# Patient Record
Sex: MALE | Race: BLACK OR AFRICAN AMERICAN | NOT HISPANIC OR LATINO | Employment: UNEMPLOYED | URBAN - METROPOLITAN AREA
[De-identification: names, ages, dates, MRNs, and addresses within clinical notes are randomized per-mention and may not be internally consistent; named-entity substitution may affect disease eponyms.]

---

## 2019-09-10 ENCOUNTER — HOSPITAL ENCOUNTER (EMERGENCY)
Facility: HOSPITAL | Age: 39
Discharge: HOME | End: 2019-09-10
Attending: EMERGENCY MEDICINE
Payer: MEDICAID

## 2019-09-10 ENCOUNTER — HOSPITAL ENCOUNTER (EMERGENCY)
Facility: HOSPITAL | Age: 39
Discharge: HOME | End: 2019-09-10
Attending: EMERGENCY MEDICINE
Payer: COMMERCIAL

## 2019-09-10 VITALS
HEART RATE: 51 BPM | DIASTOLIC BLOOD PRESSURE: 55 MMHG | WEIGHT: 150 LBS | BODY MASS INDEX: 20.32 KG/M2 | RESPIRATION RATE: 18 BRPM | TEMPERATURE: 99 F | OXYGEN SATURATION: 100 % | SYSTOLIC BLOOD PRESSURE: 96 MMHG | HEIGHT: 72 IN

## 2019-09-10 VITALS
DIASTOLIC BLOOD PRESSURE: 67 MMHG | SYSTOLIC BLOOD PRESSURE: 110 MMHG | OXYGEN SATURATION: 97 % | RESPIRATION RATE: 16 BRPM | HEART RATE: 46 BPM | TEMPERATURE: 98 F
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
## 2019-09-10 DIAGNOSIS — S00.532A CONTUSION OF LIP AND ORAL CAVITY: ICD-10-CM

## 2019-09-10 DIAGNOSIS — R55 SYNCOPE: ICD-10-CM

## 2019-09-10 DIAGNOSIS — S00.531A CONTUSION OF LIP AND ORAL CAVITY: ICD-10-CM

## 2019-09-10 DIAGNOSIS — M54.2 NECK PAIN, ACUTE: Primary | ICD-10-CM

## 2019-09-10 DIAGNOSIS — T50.901A ACCIDENTAL DRUG OVERDOSE, INITIAL ENCOUNTER: Primary | ICD-10-CM

## 2019-09-10 LAB
ABO + RH BLD: NORMAL
ALBUMIN SERPL BCP-MCNC: 3.5 G/DL (ref 3.5–5.2)
ALBUMIN SERPL BCP-MCNC: 3.5 G/DL (ref 3.5–5.2)
ALP SERPL-CCNC: 67 U/L (ref 55–135)
ALP SERPL-CCNC: 68 U/L (ref 55–135)
ALT SERPL W/O P-5'-P-CCNC: 36 U/L (ref 10–44)
ALT SERPL W/O P-5'-P-CCNC: 43 U/L (ref 10–44)
AMPHET+METHAMPHET UR QL: NEGATIVE
ANION GAP SERPL CALC-SCNC: 8 MMOL/L (ref 8–16)
ANION GAP SERPL CALC-SCNC: 9 MMOL/L (ref 8–16)
APAP SERPL-MCNC: 42 UG/ML (ref 10–20)
AST SERPL-CCNC: 45 U/L (ref 10–40)
AST SERPL-CCNC: 66 U/L (ref 10–40)
BARBITURATES UR QL SCN>200 NG/ML: NEGATIVE
BASOPHILS # BLD AUTO: 0.05 K/UL (ref 0–0.2)
BASOPHILS # BLD AUTO: 0.06 K/UL (ref 0–0.2)
BASOPHILS NFR BLD: 0.5 % (ref 0–1.9)
BASOPHILS NFR BLD: 0.6 % (ref 0–1.9)
BENZODIAZ UR QL SCN>200 NG/ML: NEGATIVE
BILIRUB SERPL-MCNC: 0.5 MG/DL (ref 0.1–1)
BILIRUB SERPL-MCNC: 0.7 MG/DL (ref 0.1–1)
BLD GP AB SCN CELLS X3 SERPL QL: NORMAL
BUN SERPL-MCNC: 8 MG/DL (ref 6–20)
BUN SERPL-MCNC: 8 MG/DL (ref 6–20)
BZE UR QL SCN: NEGATIVE
CALCIUM SERPL-MCNC: 8.8 MG/DL (ref 8.7–10.5)
CALCIUM SERPL-MCNC: 9 MG/DL (ref 8.7–10.5)
CANNABINOIDS UR QL SCN: ABNORMAL
CHLORIDE SERPL-SCNC: 108 MMOL/L (ref 95–110)
CHLORIDE SERPL-SCNC: 109 MMOL/L (ref 95–110)
CO2 SERPL-SCNC: 25 MMOL/L (ref 23–29)
CO2 SERPL-SCNC: 26 MMOL/L (ref 23–29)
CREAT SERPL-MCNC: 0.9 MG/DL (ref 0.5–1.4)
CREAT SERPL-MCNC: 0.9 MG/DL (ref 0.5–1.4)
CREAT UR-MCNC: >450 MG/DL (ref 23–375)
DIFFERENTIAL METHOD: ABNORMAL
DIFFERENTIAL METHOD: ABNORMAL
EOSINOPHIL # BLD AUTO: 0.1 K/UL (ref 0–0.5)
EOSINOPHIL # BLD AUTO: 0.1 K/UL (ref 0–0.5)
EOSINOPHIL NFR BLD: 0.8 % (ref 0–8)
EOSINOPHIL NFR BLD: 1.2 % (ref 0–8)
ERYTHROCYTE [DISTWIDTH] IN BLOOD BY AUTOMATED COUNT: 15.7 % (ref 11.5–14.5)
ERYTHROCYTE [DISTWIDTH] IN BLOOD BY AUTOMATED COUNT: 15.8 % (ref 11.5–14.5)
EST. GFR  (AFRICAN AMERICAN): >60 ML/MIN/1.73 M^2
EST. GFR  (AFRICAN AMERICAN): >60 ML/MIN/1.73 M^2
EST. GFR  (NON AFRICAN AMERICAN): >60 ML/MIN/1.73 M^2
EST. GFR  (NON AFRICAN AMERICAN): >60 ML/MIN/1.73 M^2
ETHANOL UR-MCNC: <10 MG/DL
GLUCOSE SERPL-MCNC: 105 MG/DL (ref 70–110)
GLUCOSE SERPL-MCNC: 94 MG/DL (ref 70–110)
HCT VFR BLD AUTO: 39.1 % (ref 40–54)
HCT VFR BLD AUTO: 40.6 % (ref 40–54)
HGB BLD-MCNC: 12.3 G/DL (ref 14–18)
HGB BLD-MCNC: 12.8 G/DL (ref 14–18)
IMM GRANULOCYTES # BLD AUTO: 0.04 K/UL (ref 0–0.04)
IMM GRANULOCYTES # BLD AUTO: 0.04 K/UL (ref 0–0.04)
IMM GRANULOCYTES NFR BLD AUTO: 0.4 % (ref 0–0.5)
IMM GRANULOCYTES NFR BLD AUTO: 0.5 % (ref 0–0.5)
INR PPP: 1 (ref 0.8–1.2)
LYMPHOCYTES # BLD AUTO: 1.5 K/UL (ref 1–4.8)
LYMPHOCYTES # BLD AUTO: 3 K/UL (ref 1–4.8)
LYMPHOCYTES NFR BLD: 19 % (ref 18–48)
LYMPHOCYTES NFR BLD: 27.4 % (ref 18–48)
MCH RBC QN AUTO: 24 PG (ref 27–31)
MCH RBC QN AUTO: 24.4 PG (ref 27–31)
MCHC RBC AUTO-ENTMCNC: 31.5 G/DL (ref 32–36)
MCHC RBC AUTO-ENTMCNC: 31.5 G/DL (ref 32–36)
MCV RBC AUTO: 76 FL (ref 82–98)
MCV RBC AUTO: 77 FL (ref 82–98)
METHADONE UR QL SCN>300 NG/ML: NEGATIVE
MONOCYTES # BLD AUTO: 0.6 K/UL (ref 0.3–1)
MONOCYTES # BLD AUTO: 0.9 K/UL (ref 0.3–1)
MONOCYTES NFR BLD: 7.8 % (ref 4–15)
MONOCYTES NFR BLD: 7.8 % (ref 4–15)
NEUTROPHILS # BLD AUTO: 5.7 K/UL (ref 1.8–7.7)
NEUTROPHILS # BLD AUTO: 7 K/UL (ref 1.8–7.7)
NEUTROPHILS NFR BLD: 63.1 % (ref 38–73)
NEUTROPHILS NFR BLD: 70.9 % (ref 38–73)
NRBC BLD-RTO: 0 /100 WBC
NRBC BLD-RTO: 0 /100 WBC
OPIATES UR QL SCN: NEGATIVE
PCP UR QL SCN>25 NG/ML: NEGATIVE
PLATELET # BLD AUTO: 224 K/UL (ref 150–350)
PLATELET # BLD AUTO: 251 K/UL (ref 150–350)
PMV BLD AUTO: 10.6 FL (ref 9.2–12.9)
PMV BLD AUTO: 10.6 FL (ref 9.2–12.9)
POTASSIUM SERPL-SCNC: 3.9 MMOL/L (ref 3.5–5.1)
POTASSIUM SERPL-SCNC: 3.9 MMOL/L (ref 3.5–5.1)
PROT SERPL-MCNC: 6.4 G/DL (ref 6–8.4)
PROT SERPL-MCNC: 6.5 G/DL (ref 6–8.4)
PROTHROMBIN TIME: 10.7 SEC (ref 9–12.5)
RBC # BLD AUTO: 5.12 M/UL (ref 4.6–6.2)
RBC # BLD AUTO: 5.25 M/UL (ref 4.6–6.2)
SODIUM SERPL-SCNC: 142 MMOL/L (ref 136–145)
SODIUM SERPL-SCNC: 143 MMOL/L (ref 136–145)
TOXICOLOGY INFORMATION: ABNORMAL
WBC # BLD AUTO: 11.08 K/UL (ref 3.9–12.7)
WBC # BLD AUTO: 8.1 K/UL (ref 3.9–12.7)

## 2019-09-10 PROCEDURE — 99284 PR EMERGENCY DEPT VISIT,LEVEL IV: ICD-10-PCS | Mod: 25,,, | Performed by: EMERGENCY MEDICINE

## 2019-09-10 PROCEDURE — 80307 DRUG TEST PRSMV CHEM ANLYZR: CPT

## 2019-09-10 PROCEDURE — 96360 HYDRATION IV INFUSION INIT: CPT

## 2019-09-10 PROCEDURE — 80053 COMPREHEN METABOLIC PANEL: CPT

## 2019-09-10 PROCEDURE — 85025 COMPLETE CBC W/AUTO DIFF WBC: CPT

## 2019-09-10 PROCEDURE — 80329 ANALGESICS NON-OPIOID 1 OR 2: CPT

## 2019-09-10 PROCEDURE — 99285 EMERGENCY DEPT VISIT HI MDM: CPT | Mod: 25,27

## 2019-09-10 PROCEDURE — 99285 PR EMERGENCY DEPT VISIT,LEVEL V: ICD-10-PCS | Mod: ,,, | Performed by: EMERGENCY MEDICINE

## 2019-09-10 PROCEDURE — 25000003 PHARM REV CODE 250: Performed by: EMERGENCY MEDICINE

## 2019-09-10 PROCEDURE — 96361 HYDRATE IV INFUSION ADD-ON: CPT

## 2019-09-10 PROCEDURE — 93005 ELECTROCARDIOGRAM TRACING: CPT

## 2019-09-10 PROCEDURE — 63600175 PHARM REV CODE 636 W HCPCS: Performed by: EMERGENCY MEDICINE

## 2019-09-10 PROCEDURE — 85025 COMPLETE CBC W/AUTO DIFF WBC: CPT | Mod: 91

## 2019-09-10 PROCEDURE — 99284 EMERGENCY DEPT VISIT MOD MDM: CPT | Mod: 25

## 2019-09-10 PROCEDURE — 25500020 PHARM REV CODE 255: Performed by: EMERGENCY MEDICINE

## 2019-09-10 PROCEDURE — 85610 PROTHROMBIN TIME: CPT

## 2019-09-10 PROCEDURE — 80053 COMPREHEN METABOLIC PANEL: CPT | Mod: 91

## 2019-09-10 PROCEDURE — 93010 ELECTROCARDIOGRAM REPORT: CPT | Mod: ,,, | Performed by: INTERNAL MEDICINE

## 2019-09-10 PROCEDURE — 93010 EKG 12-LEAD: ICD-10-PCS | Mod: ,,, | Performed by: INTERNAL MEDICINE

## 2019-09-10 PROCEDURE — 99284 EMERGENCY DEPT VISIT MOD MDM: CPT | Mod: 25,,, | Performed by: EMERGENCY MEDICINE

## 2019-09-10 PROCEDURE — 96374 THER/PROPH/DIAG INJ IV PUSH: CPT

## 2019-09-10 PROCEDURE — 63600175 PHARM REV CODE 636 W HCPCS: Performed by: STUDENT IN AN ORGANIZED HEALTH CARE EDUCATION/TRAINING PROGRAM

## 2019-09-10 PROCEDURE — 86850 RBC ANTIBODY SCREEN: CPT

## 2019-09-10 PROCEDURE — 99285 EMERGENCY DEPT VISIT HI MDM: CPT | Mod: ,,, | Performed by: EMERGENCY MEDICINE

## 2019-09-10 RX ORDER — ALBUTEROL SULFATE 2.5 MG/.5ML
2.5 SOLUTION RESPIRATORY (INHALATION)
Status: DISCONTINUED | OUTPATIENT
Start: 2019-09-10 | End: 2019-09-10

## 2019-09-10 RX ORDER — SODIUM CHLORIDE 9 MG/ML
500 INJECTION, SOLUTION INTRAVENOUS
Status: COMPLETED | OUTPATIENT
Start: 2019-09-10 | End: 2019-09-10

## 2019-09-10 RX ORDER — SODIUM CHLORIDE 9 MG/ML
1000 INJECTION, SOLUTION INTRAVENOUS
Status: COMPLETED | OUTPATIENT
Start: 2019-09-10 | End: 2019-09-10

## 2019-09-10 RX ORDER — ACETAMINOPHEN 500 MG
1000 TABLET ORAL
Status: COMPLETED | OUTPATIENT
Start: 2019-09-10 | End: 2019-09-10

## 2019-09-10 RX ORDER — ONDANSETRON 2 MG/ML
4 INJECTION INTRAMUSCULAR; INTRAVENOUS
Status: COMPLETED | OUTPATIENT
Start: 2019-09-10 | End: 2019-09-10

## 2019-09-10 RX ADMIN — ONDANSETRON 4 MG: 2 INJECTION INTRAMUSCULAR; INTRAVENOUS at 06:09

## 2019-09-10 RX ADMIN — ACETAMINOPHEN 1000 MG: 500 TABLET ORAL at 06:09

## 2019-09-10 RX ADMIN — IOHEXOL 75 ML: 350 INJECTION, SOLUTION INTRAVENOUS at 07:09

## 2019-09-10 RX ADMIN — SODIUM CHLORIDE 500 ML: 0.9 INJECTION, SOLUTION INTRAVENOUS at 06:09

## 2019-09-10 RX ADMIN — SODIUM CHLORIDE 1000 ML: 0.9 INJECTION, SOLUTION INTRAVENOUS at 07:09
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
## 2019-09-10 NOTE — ED NOTES
Celina Salvador from [facility] requesting dc summary and updated AVS to be faxed [phone].  CM faxed dc order, dc summary, AVS. LOC: The patient is awake, alert and aware of environment with an appropriate affect, the patient is oriented x 3 and speaking appropriately.  APPEARANCE: Patient resting comfortably and in no acute distress, patient is clean and well groomed, patient's clothing is properly fastened.  SKIN: The skin is warm and dry, color consistent with ethnicity, patient has normal skin turgor and moist mucus membranes, skin intact, no breakdown or bruising noted.  MUSCULOSKELETAL: Patient moving all extremities spontaneously, no obvious swelling or deformities noted.  RESPIRATORY: Airway is open and patent, respirations are spontaneous, patient has a normal effort and rate, no accessory muscle use noted.  CARDIAC: Patient has bradycardia and regular rhythm, no periphreal edema noted, capillary refill < 3 seconds.  ABDOMEN: Soft and non tender to palpation, no distention noted.  NEUROLOGIC:  facial expression is symmetrical, patient moving all extremities spontaneously, normal sensation in all extremities when touched with a finger.  Follows all commands appropriately.
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
## 2019-09-11 NOTE — DISCHARGE INSTRUCTIONS
Please take OTC medications at appropriate doses described on side of bottles.  Stay adequately hydrated.

## 2021-08-25 ENCOUNTER — CLINICAL SUPPORT (OUTPATIENT)
Dept: URGENT CARE | Facility: CLINIC | Age: 41
End: 2021-08-25
Payer: MEDICAID

## 2021-08-25 DIAGNOSIS — Z11.59 SCREENING FOR VIRAL DISEASE: Primary | ICD-10-CM

## 2021-08-25 LAB
CTP QC/QA: YES
SARS-COV-2 RDRP RESP QL NAA+PROBE: NEGATIVE

## 2021-08-25 PROCEDURE — U0002: ICD-10-PCS | Mod: QW,S$GLB,, | Performed by: STUDENT IN AN ORGANIZED HEALTH CARE EDUCATION/TRAINING PROGRAM

## 2021-08-25 PROCEDURE — 99211 OFF/OP EST MAY X REQ PHY/QHP: CPT | Mod: S$GLB,,, | Performed by: STUDENT IN AN ORGANIZED HEALTH CARE EDUCATION/TRAINING PROGRAM

## 2021-08-25 PROCEDURE — 99211 PR OFFICE/OUTPT VISIT, EST, LEVL I: ICD-10-PCS | Mod: S$GLB,,, | Performed by: STUDENT IN AN ORGANIZED HEALTH CARE EDUCATION/TRAINING PROGRAM

## 2021-08-25 PROCEDURE — U0002 COVID-19 LAB TEST NON-CDC: HCPCS | Mod: QW,S$GLB,, | Performed by: STUDENT IN AN ORGANIZED HEALTH CARE EDUCATION/TRAINING PROGRAM

## 2023-01-05 NOTE — ED NOTES
"Pt states " I was hurting so I took 10 ibuprofen.  The doctor said it was gonna hurt but I didn't think it would be that bad".  Pt states that he continues with right shoulder/chest pain.  Pt states that since taking the ibuprofen he was weak and became diaphoretic.  Pt states he took 10 500mg tablets  " PRINCIPAL DISCHARGE DIAGNOSIS  Diagnosis: Cavitary pneumonia  Assessment and Plan of Treatment: You have no respitatory complaints and are not requiring oxygen.  You completed 6 wks of IV antibiotic called zosyn on 1/6. Please follow-up with primary care to repeat imaging and assure resolution on pneumonia.      SECONDARY DISCHARGE DIAGNOSES  Diagnosis: Stage 5 chronic kidney disease not on chronic dialysis  Assessment and Plan of Treatment: You had decreased kidney function that worsened in setting of infection.  You got dialysis at few times while hospitalized at [facility] but no longer need it.  Given how weak you kidneys are it is expected you will soon require permanent dialysis a fistula was placed in order to have a way to give you dialysis when you need it in future.  Please follow with your neprhologist and the vascular surgeon who did your fistula in 10-14d.    Diagnosis: Chronic pancreatitis  Assessment and Plan of Treatment: Your pancreas does not work well to secrete enzymes need for digestion.  Please take your Creon.    Diagnosis: Esophageal stricture  Assessment and Plan of Treatment: You had a tightening in esophagus before for which you had a stent in past.  It was removed.  Follow-up with GI if you have trouble swallwing or food is getting stuck.    Diagnosis: History of esophagitis  Assessment and Plan of Treatment: Take your acid pills are recommended by GI and follow-up a outpatient.    Diagnosis: Nonobstructive atherosclerosis of coronary artery  Assessment and Plan of Treatment: You had high heart enzymes and postive stress test wiht low L ventricular function on your echo.  A cath was done on 1/4 with no major blockages.  You were seen by cardiology.    Diagnosis: Chronic systolic heart failure  Assessment and Plan of Treatment: Follow-up with cardiology as outpatient.    Diagnosis: Severe protein-calorie malnutrition  Assessment and Plan of Treatment: Take your Creon to help absorption.  Eat varied heatlh diet and increase caloric intake.  You were seen by nutrition team.     PRINCIPAL DISCHARGE DIAGNOSIS  Diagnosis: Cavitary pneumonia  Assessment and Plan of Treatment: You have no respitatory complaints and are not requiring oxygen.  You completed 6 wks of IV antibiotic called zosyn on 1/6. Please follow-up with primary care to repeat imaging and assure resolution on pneumonia.      SECONDARY DISCHARGE DIAGNOSES  Diagnosis: Stage 5 chronic kidney disease not on chronic dialysis  Assessment and Plan of Treatment: You had decreased kidney function that worsened in setting of infection.  You got dialysis at few times while hospitalized at [facility] but no longer need it.  Given how weak you kidneys are it is expected you will soon require permanent dialysis a fistula was placed in order to have a way to give you dialysis when you need it in future.  Please follow with your neprhologist and the vascular surgeon who did your fistula in 10-14d.    Diagnosis: Chronic pancreatitis  Assessment and Plan of Treatment: Your pancreas does not work well to secrete enzymes need for digestion.  Please take your Creon.    Diagnosis: Esophageal stricture  Assessment and Plan of Treatment: You had a tightening in esophagus before for which you had a stent in past.  It was removed.  Follow-up with GI if you have trouble swallwing or food is getting stuck.    Diagnosis: History of esophagitis  Assessment and Plan of Treatment: Take your acid pills (pantoprazole twice a day) as recommended by GI and follow-up a outpatient.    Diagnosis: Nonobstructive atherosclerosis of coronary artery  Assessment and Plan of Treatment: You had high heart enzymes and postive stress test wiht low L ventricular function on your echo.  A cath was done on 1/4 with no major blockages.  You were seen by cardiology. Please follow up with cardiology outpatient for further management    Diagnosis: Chronic systolic heart failure  Assessment and Plan of Treatment: Follow-up with cardiology as outpatient.    Diagnosis: Severe protein-calorie malnutrition  Assessment and Plan of Treatment: Take your Creon to help absorption.  Eat varied heatlh diet and increase caloric intake.  You were seen by nutrition team.     PRINCIPAL DISCHARGE DIAGNOSIS  Diagnosis: Cavitary pneumonia  Assessment and Plan of Treatment: You have no respitatory complaints and are not requiring oxygen.  You completed 6 wks of IV antibiotic called zosyn on 1/6. Please follow-up with primary care to repeat imaging and assure resolution on pneumonia.      SECONDARY DISCHARGE DIAGNOSES  Diagnosis: Stage 5 chronic kidney disease not on chronic dialysis  Assessment and Plan of Treatment: You had decreased kidney function that worsened in setting of infection and now require permanent dialysis. A fistula was placed in order to have a way to give you dialysis for the long term. You have catheter in place that will need to be removed in the coming weeks after your fistual matures. Please follow with your neprhologist and the vascular surgeon who did your fistula in 10-14d.    Diagnosis: Chronic pancreatitis  Assessment and Plan of Treatment: Your pancreas does not work well to secrete enzymes need for digestion.  Please take your Creon.    Diagnosis: Esophageal stricture  Assessment and Plan of Treatment: You had a tightening in esophagus before for which you had a stent in past.  It was removed.  Follow-up with GI if you have trouble swallwing or food is getting stuck.    Diagnosis: History of esophagitis  Assessment and Plan of Treatment: Take your acid pills (pantoprazole twice a day) as recommended by GI and follow-up a outpatient.    Diagnosis: Nonobstructive atherosclerosis of coronary artery  Assessment and Plan of Treatment: You had high heart enzymes and postive stress test wiht low L ventricular function on your echo.  A cath was done on 1/4 with no major blockages.  You were seen by cardiology. Please follow up with cardiology outpatient for further management    Diagnosis: Deep venous thrombosis of right upper limb  Assessment and Plan of Treatment: You were found to have clot in your right arm and was started on blood thinners with apixaban. Please contnue to take medication two times a day as directed.    Diagnosis: Chronic systolic heart failure  Assessment and Plan of Treatment: Follow-up with cardiology as outpatient.    Diagnosis: Severe protein-calorie malnutrition  Assessment and Plan of Treatment: Take your Creon to help absorption.  Eat varied heatlh diet and increase caloric intake.  You were seen by nutrition team.

## 2023-05-27 ENCOUNTER — HOSPITAL ENCOUNTER (INPATIENT)
Facility: HOSPITAL | Age: 43
LOS: 4 days | Discharge: LEFT BEFORE DISCHARGE | DRG: 871 | End: 2023-05-31
Attending: EMERGENCY MEDICINE | Admitting: STUDENT IN AN ORGANIZED HEALTH CARE EDUCATION/TRAINING PROGRAM
Payer: MEDICAID

## 2023-05-27 DIAGNOSIS — R78.81 BACTEREMIA DUE TO STREPTOCOCCUS PNEUMONIAE: ICD-10-CM

## 2023-05-27 DIAGNOSIS — R07.9 CHEST PAIN: ICD-10-CM

## 2023-05-27 DIAGNOSIS — B95.3 BACTEREMIA DUE TO STREPTOCOCCUS PNEUMONIAE: ICD-10-CM

## 2023-05-27 DIAGNOSIS — G00.1 STREPTOCOCCUS PNEUMONIAE MENINGITIS: ICD-10-CM

## 2023-05-27 DIAGNOSIS — R52 BODY ACHES: ICD-10-CM

## 2023-05-27 DIAGNOSIS — R78.81 BACTEREMIA: ICD-10-CM

## 2023-05-27 DIAGNOSIS — G03.9 MENINGITIS: ICD-10-CM

## 2023-05-27 DIAGNOSIS — D72.829 LEUKOCYTOSIS, UNSPECIFIED TYPE: Primary | ICD-10-CM

## 2023-05-27 PROBLEM — R93.0 ABNORMAL CT OF THE HEAD: Status: ACTIVE | Noted: 2023-05-27

## 2023-05-27 LAB
ALBUMIN SERPL BCP-MCNC: 2.7 G/DL (ref 3.5–5.2)
ALLENS TEST: NORMAL
ALP SERPL-CCNC: 94 U/L (ref 55–135)
ALT SERPL W/O P-5'-P-CCNC: 29 U/L (ref 10–44)
ANION GAP SERPL CALC-SCNC: 12 MMOL/L (ref 8–16)
ANISOCYTOSIS BLD QL SMEAR: SLIGHT
AST SERPL-CCNC: 32 U/L (ref 10–40)
BASOPHILS # BLD AUTO: 0.04 K/UL (ref 0–0.2)
BASOPHILS NFR BLD: 0.2 % (ref 0–1.9)
BILIRUB SERPL-MCNC: 0.8 MG/DL (ref 0.1–1)
BNP SERPL-MCNC: 134 PG/ML (ref 0–99)
BUN SERPL-MCNC: 17 MG/DL (ref 6–20)
CALCIUM SERPL-MCNC: 10.1 MG/DL (ref 8.7–10.5)
CHLORIDE SERPL-SCNC: 103 MMOL/L (ref 95–110)
CK SERPL-CCNC: 85 U/L (ref 20–200)
CO2 SERPL-SCNC: 24 MMOL/L (ref 23–29)
CREAT SERPL-MCNC: 0.8 MG/DL (ref 0.5–1.4)
DIFFERENTIAL METHOD: ABNORMAL
EOSINOPHIL # BLD AUTO: 0 K/UL (ref 0–0.5)
EOSINOPHIL NFR BLD: 0 % (ref 0–8)
ERYTHROCYTE [DISTWIDTH] IN BLOOD BY AUTOMATED COUNT: 14 % (ref 11.5–14.5)
EST. GFR  (NO RACE VARIABLE): >60 ML/MIN/1.73 M^2
GLUCOSE SERPL-MCNC: 145 MG/DL (ref 70–110)
HCT VFR BLD AUTO: 34.3 % (ref 40–54)
HCV AB SERPL QL IA: NORMAL
HETEROPH AB SERPL QL IA: NEGATIVE
HGB BLD-MCNC: 11.1 G/DL (ref 14–18)
HIV 1+2 AB+HIV1 P24 AG SERPL QL IA: NORMAL
IMM GRANULOCYTES # BLD AUTO: 0.36 K/UL (ref 0–0.04)
IMM GRANULOCYTES NFR BLD AUTO: 1.4 % (ref 0–0.5)
INFLUENZA A, MOLECULAR: NEGATIVE
INFLUENZA B, MOLECULAR: NEGATIVE
LDH SERPL L TO P-CCNC: 1.87 MMOL/L (ref 0.5–2.2)
LIPASE SERPL-CCNC: 7 U/L (ref 4–60)
LYMPHOCYTES # BLD AUTO: 0.7 K/UL (ref 1–4.8)
LYMPHOCYTES NFR BLD: 2.6 % (ref 18–48)
MCH RBC QN AUTO: 23.5 PG (ref 27–31)
MCHC RBC AUTO-ENTMCNC: 32.4 G/DL (ref 32–36)
MCV RBC AUTO: 73 FL (ref 82–98)
MONOCYTES # BLD AUTO: 1.3 K/UL (ref 0.3–1)
MONOCYTES NFR BLD: 5 % (ref 4–15)
NEUTROPHILS # BLD AUTO: 23.7 K/UL (ref 1.8–7.7)
NEUTROPHILS NFR BLD: 90.8 % (ref 38–73)
NRBC BLD-RTO: 0 /100 WBC
PLATELET # BLD AUTO: 276 K/UL (ref 150–450)
PLATELET BLD QL SMEAR: ABNORMAL
PMV BLD AUTO: 10.6 FL (ref 9.2–12.9)
POTASSIUM SERPL-SCNC: 3.8 MMOL/L (ref 3.5–5.1)
PROCALCITONIN SERPL IA-MCNC: 0.46 NG/ML
PROCALCITONIN SERPL IA-MCNC: 0.46 NG/ML
PROT SERPL-MCNC: 7.8 G/DL (ref 6–8.4)
RBC # BLD AUTO: 4.73 M/UL (ref 4.6–6.2)
SAMPLE: NORMAL
SARS-COV-2 RDRP RESP QL NAA+PROBE: NEGATIVE
SCHISTOCYTES BLD QL SMEAR: ABNORMAL
SITE: NORMAL
SODIUM SERPL-SCNC: 139 MMOL/L (ref 136–145)
SPECIMEN SOURCE: NORMAL
TROPONIN I SERPL DL<=0.01 NG/ML-MCNC: <0.006 NG/ML (ref 0–0.03)
WBC # BLD AUTO: 26.04 K/UL (ref 3.9–12.7)
WBC TOXIC VACUOLES BLD QL SMEAR: PRESENT

## 2023-05-27 PROCEDURE — 99291 PR CRITICAL CARE, E/M 30-74 MINUTES: ICD-10-PCS | Mod: 25,,, | Performed by: EMERGENCY MEDICINE

## 2023-05-27 PROCEDURE — 85025 COMPLETE CBC W/AUTO DIFF WBC: CPT | Performed by: EMERGENCY MEDICINE

## 2023-05-27 PROCEDURE — 80053 COMPREHEN METABOLIC PANEL: CPT | Performed by: EMERGENCY MEDICINE

## 2023-05-27 PROCEDURE — 62270 DX LMBR SPI PNXR: CPT | Mod: ,,, | Performed by: EMERGENCY MEDICINE

## 2023-05-27 PROCEDURE — 63600175 PHARM REV CODE 636 W HCPCS: Performed by: EMERGENCY MEDICINE

## 2023-05-27 PROCEDURE — 84145 PROCALCITONIN (PCT): CPT | Performed by: STUDENT IN AN ORGANIZED HEALTH CARE EDUCATION/TRAINING PROGRAM

## 2023-05-27 PROCEDURE — 83605 ASSAY OF LACTIC ACID: CPT

## 2023-05-27 PROCEDURE — 84484 ASSAY OF TROPONIN QUANT: CPT | Performed by: STUDENT IN AN ORGANIZED HEALTH CARE EDUCATION/TRAINING PROGRAM

## 2023-05-27 PROCEDURE — 83880 ASSAY OF NATRIURETIC PEPTIDE: CPT | Performed by: STUDENT IN AN ORGANIZED HEALTH CARE EDUCATION/TRAINING PROGRAM

## 2023-05-27 PROCEDURE — 99291 CRITICAL CARE FIRST HOUR: CPT | Mod: 25,,, | Performed by: EMERGENCY MEDICINE

## 2023-05-27 PROCEDURE — 96374 THER/PROPH/DIAG INJ IV PUSH: CPT

## 2023-05-27 PROCEDURE — 82550 ASSAY OF CK (CPK): CPT | Performed by: STUDENT IN AN ORGANIZED HEALTH CARE EDUCATION/TRAINING PROGRAM

## 2023-05-27 PROCEDURE — 86803 HEPATITIS C AB TEST: CPT | Performed by: PHYSICIAN ASSISTANT

## 2023-05-27 PROCEDURE — 82803 BLOOD GASES ANY COMBINATION: CPT

## 2023-05-27 PROCEDURE — 81001 URINALYSIS AUTO W/SCOPE: CPT | Performed by: STUDENT IN AN ORGANIZED HEALTH CARE EDUCATION/TRAINING PROGRAM

## 2023-05-27 PROCEDURE — 87389 HIV-1 AG W/HIV-1&-2 AB AG IA: CPT | Performed by: PHYSICIAN ASSISTANT

## 2023-05-27 PROCEDURE — 96361 HYDRATE IV INFUSION ADD-ON: CPT

## 2023-05-27 PROCEDURE — 99285 EMERGENCY DEPT VISIT HI MDM: CPT | Mod: 25

## 2023-05-27 PROCEDURE — 86308 HETEROPHILE ANTIBODY SCREEN: CPT | Performed by: STUDENT IN AN ORGANIZED HEALTH CARE EDUCATION/TRAINING PROGRAM

## 2023-05-27 PROCEDURE — 62270 PR SPINAL PUNCTURE,LUMBAR,DIAGNOSTIC: ICD-10-PCS | Mod: ,,, | Performed by: EMERGENCY MEDICINE

## 2023-05-27 PROCEDURE — 99900035 HC TECH TIME PER 15 MIN (STAT)

## 2023-05-27 PROCEDURE — 96375 TX/PRO/DX INJ NEW DRUG ADDON: CPT

## 2023-05-27 PROCEDURE — 93005 ELECTROCARDIOGRAM TRACING: CPT

## 2023-05-27 PROCEDURE — U0002 COVID-19 LAB TEST NON-CDC: HCPCS | Performed by: STUDENT IN AN ORGANIZED HEALTH CARE EDUCATION/TRAINING PROGRAM

## 2023-05-27 PROCEDURE — 87077 CULTURE AEROBIC IDENTIFY: CPT | Mod: 59 | Performed by: STUDENT IN AN ORGANIZED HEALTH CARE EDUCATION/TRAINING PROGRAM

## 2023-05-27 PROCEDURE — 12000002 HC ACUTE/MED SURGE SEMI-PRIVATE ROOM

## 2023-05-27 PROCEDURE — 87040 BLOOD CULTURE FOR BACTERIA: CPT | Performed by: STUDENT IN AN ORGANIZED HEALTH CARE EDUCATION/TRAINING PROGRAM

## 2023-05-27 PROCEDURE — 87154 CUL TYP ID BLD PTHGN 6+ TRGT: CPT | Performed by: STUDENT IN AN ORGANIZED HEALTH CARE EDUCATION/TRAINING PROGRAM

## 2023-05-27 PROCEDURE — 93010 EKG 12-LEAD: ICD-10-PCS | Mod: ,,, | Performed by: INTERNAL MEDICINE

## 2023-05-27 PROCEDURE — 83690 ASSAY OF LIPASE: CPT | Performed by: STUDENT IN AN ORGANIZED HEALTH CARE EDUCATION/TRAINING PROGRAM

## 2023-05-27 PROCEDURE — 87186 SC STD MICRODIL/AGAR DIL: CPT | Performed by: STUDENT IN AN ORGANIZED HEALTH CARE EDUCATION/TRAINING PROGRAM

## 2023-05-27 PROCEDURE — 25500020 PHARM REV CODE 255: Performed by: EMERGENCY MEDICINE

## 2023-05-27 PROCEDURE — 87502 INFLUENZA DNA AMP PROBE: CPT | Performed by: STUDENT IN AN ORGANIZED HEALTH CARE EDUCATION/TRAINING PROGRAM

## 2023-05-27 PROCEDURE — 93010 ELECTROCARDIOGRAM REPORT: CPT | Mod: ,,, | Performed by: INTERNAL MEDICINE

## 2023-05-27 PROCEDURE — 63600175 PHARM REV CODE 636 W HCPCS: Performed by: STUDENT IN AN ORGANIZED HEALTH CARE EDUCATION/TRAINING PROGRAM

## 2023-05-27 RX ORDER — MORPHINE SULFATE 4 MG/ML
4 INJECTION, SOLUTION INTRAMUSCULAR; INTRAVENOUS
Status: COMPLETED | OUTPATIENT
Start: 2023-05-27 | End: 2023-05-27

## 2023-05-27 RX ORDER — ONDANSETRON 2 MG/ML
4 INJECTION INTRAMUSCULAR; INTRAVENOUS
Status: COMPLETED | OUTPATIENT
Start: 2023-05-27 | End: 2023-05-27

## 2023-05-27 RX ORDER — LIDOCAINE HYDROCHLORIDE 10 MG/ML
1 INJECTION INFILTRATION; PERINEURAL ONCE
Status: COMPLETED | OUTPATIENT
Start: 2023-05-28 | End: 2023-05-28

## 2023-05-27 RX ORDER — KETOROLAC TROMETHAMINE 30 MG/ML
15 INJECTION, SOLUTION INTRAMUSCULAR; INTRAVENOUS
Status: COMPLETED | OUTPATIENT
Start: 2023-05-27 | End: 2023-05-27

## 2023-05-27 RX ADMIN — SODIUM CHLORIDE, POTASSIUM CHLORIDE, SODIUM LACTATE AND CALCIUM CHLORIDE 2109 ML: 600; 310; 30; 20 INJECTION, SOLUTION INTRAVENOUS at 07:05

## 2023-05-27 RX ADMIN — MORPHINE SULFATE 4 MG: 4 INJECTION INTRAVENOUS at 08:05

## 2023-05-27 RX ADMIN — IOHEXOL 75 ML: 350 INJECTION, SOLUTION INTRAVENOUS at 08:05

## 2023-05-27 RX ADMIN — ONDANSETRON 4 MG: 2 INJECTION INTRAMUSCULAR; INTRAVENOUS at 07:05

## 2023-05-27 RX ADMIN — KETOROLAC TROMETHAMINE 15 MG: 30 INJECTION, SOLUTION INTRAMUSCULAR; INTRAVENOUS at 07:05

## 2023-05-27 NOTE — FIRST PROVIDER EVALUATION
Medical screening examination initiated.  I have conducted a focused provider triage encounter, findings are as follows:    Brief history of present illness:  Headache, body aches, dizziness that have been present for 3 days.  Also complains of back pain and chest pain.  My chest hurt ever since my lung collapsed, at least that is what I think happened.    Vitals:    05/27/23 1729   BP: 120/63   Pulse: 76   Resp: 18   Temp: 98.3 °F (36.8 °C)   TempSrc: Oral   SpO2: 99%   Weight: 70.3 kg (155 lb)   Height: 6' (1.829 m)       Pertinent physical exam:  Stiff neck, no focal neuro findings    Brief workup plan:  to main ED for workup, likely LP    Preliminary workup initiated; this workup will be continued and followed by the physician or advanced practice provider that is assigned to the patient when roomed.

## 2023-05-27 NOTE — ED NOTES
Patient identifiers verified and correct for  Mr Jean   C/C: ALl over body aches SEE NN  APPEARANCE: awake and alert. PAIN  10/10 Moaning at intervals  SKIN: warm, dry and intact. No breakdown or bruising.  MUSCULOSKELETAL: Patient moving all extremities spontaneously, no obvious swelling or deformities noted. Ambulates independently.  RESPIRATORY: Denies shortness of breath.Respirations unlabored.   CARDIAC: Denies CP, 2+ distal pulses; no peripheral edema  ABDOMEN: S/ND/NT, Denies nausea  : voids spontaneously, denies difficulty  Neurologic: AAO x 4; follows commands equal strength in all extremities; denies numbness/tingling. Denies dizziness  denies new weakness, reports all over body aches, headache

## 2023-05-27 NOTE — ED NOTES
Patient states COVID symptoms, body aches, back pain , headache, SOB  x 3 days, Moaning at intervals,

## 2023-05-27 NOTE — ED NOTES
Received from intake. Pt resting in semifowlers position. Alert & oriented. C/o generalized body aches. Blankets/pillows provided for comfort. Updated on plan of care and oriented to room/ call light usage. Pt v/u. Lights dim for comfort per pt request. Moves independently in bed. Respirations E/U

## 2023-05-28 LAB
ACINETOBACTER CALCOACETICUS/BAUMANNII COMPLEX: NOT DETECTED
ALBUMIN SERPL BCP-MCNC: 2.4 G/DL (ref 3.5–5.2)
ALP SERPL-CCNC: 111 U/L (ref 55–135)
ALT SERPL W/O P-5'-P-CCNC: 36 U/L (ref 10–44)
ANION GAP SERPL CALC-SCNC: 9 MMOL/L (ref 8–16)
AST SERPL-CCNC: 27 U/L (ref 10–40)
BACTERIA #/AREA URNS AUTO: ABNORMAL /HPF
BACTEROIDES FRAGILIS: NOT DETECTED
BASOPHILS # BLD AUTO: 0.05 K/UL (ref 0–0.2)
BASOPHILS NFR BLD: 0.2 % (ref 0–1.9)
BILIRUB SERPL-MCNC: 0.8 MG/DL (ref 0.1–1)
BILIRUB UR QL STRIP: NEGATIVE
BUN SERPL-MCNC: 14 MG/DL (ref 6–20)
C GATTII+NEOFOR DNA CSF QL NAA+NON-PROBE: NOT DETECTED
CALCIUM SERPL-MCNC: 9.6 MG/DL (ref 8.7–10.5)
CANDIDA ALBICANS: NOT DETECTED
CANDIDA AURIS: NOT DETECTED
CANDIDA GLABRATA: NOT DETECTED
CANDIDA KRUSEI: NOT DETECTED
CANDIDA PARAPSILOSIS: NOT DETECTED
CANDIDA TROPICALIS: NOT DETECTED
CHLORIDE SERPL-SCNC: 104 MMOL/L (ref 95–110)
CLARITY CSF: ABNORMAL
CLARITY UR REFRACT.AUTO: CLEAR
CMV DNA CSF QL NAA+NON-PROBE: NOT DETECTED
CO2 SERPL-SCNC: 27 MMOL/L (ref 23–29)
COLOR CSF: COLORLESS
COLOR UR AUTO: ABNORMAL
CREAT SERPL-MCNC: 0.7 MG/DL (ref 0.5–1.4)
CRYPTOCOCCUS NEOFORMANS/GATTII: NOT DETECTED
CSF TUBE NUMBER: 1
CSF TUBE NUMBER: 1
CTX-M GENE: NOT DETECTED
DIFFERENTIAL METHOD: ABNORMAL
E COLI K1 DNA CSF QL NAA+NON-PROBE: NOT DETECTED
ENTEROBACTER CLOACAE COMPLEX: NOT DETECTED
ENTEROBACTERALES: NOT DETECTED
ENTEROCOCCUS FAECALIS: NOT DETECTED
ENTEROCOCCUS FAECIUM: NOT DETECTED
EOSINOPHIL # BLD AUTO: 0 K/UL (ref 0–0.5)
EOSINOPHIL NFR BLD: 0.1 % (ref 0–8)
EOSINOPHIL NFR CSF MANUAL: 1 %
ERYTHROCYTE [DISTWIDTH] IN BLOOD BY AUTOMATED COUNT: 14.1 % (ref 11.5–14.5)
ESCHERICHIA COLI: NOT DETECTED
EST. GFR  (NO RACE VARIABLE): >60 ML/MIN/1.73 M^2
EV RNA CSF QL NAA+NON-PROBE: NOT DETECTED
GLUCOSE CSF-MCNC: <5 MG/DL (ref 40–70)
GLUCOSE SERPL-MCNC: 159 MG/DL (ref 70–110)
GLUCOSE UR QL STRIP: NEGATIVE
GP B STREP DNA CSF QL NAA+NON-PROBE: NOT DETECTED
HAEM INFLU DNA CSF QL NAA+NON-PROBE: NOT DETECTED
HAEM INFLU DNA CSF QL NAA+NON-PROBE: NOT DETECTED
HAEMOPHILUS INFLUENZAE: NOT DETECTED
HCT VFR BLD AUTO: 31.1 % (ref 40–54)
HGB BLD-MCNC: 10.2 G/DL (ref 14–18)
HGB UR QL STRIP: ABNORMAL
HHV6 DNA CSF QL NAA+NON-PROBE: NOT DETECTED
HSV1 DNA CSF QL NAA+NON-PROBE: NOT DETECTED
HSV2 DNA CSF QL NAA+NON-PROBE: NOT DETECTED
HYALINE CASTS UR QL AUTO: 0 /LPF
IMM GRANULOCYTES # BLD AUTO: 0.28 K/UL (ref 0–0.04)
IMM GRANULOCYTES NFR BLD AUTO: 0.9 % (ref 0–0.5)
IMP GENE: NOT DETECTED
KETONES UR QL STRIP: NEGATIVE
KLEBSIELLA AEROGENES: NOT DETECTED
KLEBSIELLA OXYTOCA: NOT DETECTED
KLEBSIELLA PNEUMONIAE GROUP: NOT DETECTED
KPC: NOT DETECTED
LEUKOCYTE ESTERASE UR QL STRIP: NEGATIVE
LISTERIA MONOCYTOGENES: NOT DETECTED
LYMPHOCYTES # BLD AUTO: 0.8 K/UL (ref 1–4.8)
LYMPHOCYTES NFR BLD: 2.5 % (ref 18–48)
LYMPHOCYTES NFR CSF MANUAL: 13 % (ref 40–80)
MAGNESIUM SERPL-MCNC: 2.2 MG/DL (ref 1.6–2.6)
MCH RBC QN AUTO: 23.8 PG (ref 27–31)
MCHC RBC AUTO-ENTMCNC: 32.8 G/DL (ref 32–36)
MCR-1: NOT DETECTED
MCV RBC AUTO: 73 FL (ref 82–98)
MEC A/C AND MREJ (MRSA): NOT DETECTED
MEC A/C: NOT DETECTED
MICROSCOPIC COMMENT: ABNORMAL
MONOCYTES # BLD AUTO: 1.2 K/UL (ref 0.3–1)
MONOCYTES NFR BLD: 4 % (ref 4–15)
MONOS+MACROS NFR CSF MANUAL: 12 % (ref 15–45)
N MEN DNA CSF QL NAA+NON-PROBE: NOT DETECTED
NDM GENE: NOT DETECTED
NEISSERIA MENINGITIDIS: NOT DETECTED
NEUTROPHILS # BLD AUTO: 27.5 K/UL (ref 1.8–7.7)
NEUTROPHILS NFR BLD: 92.3 % (ref 38–73)
NEUTROPHILS NFR CSF MANUAL: 74 % (ref 0–6)
NITRITE UR QL STRIP: NEGATIVE
NRBC BLD-RTO: 0 /100 WBC
OXA-48-LIKE: NOT DETECTED
PARECHOVIRUS A RNA CSF QL NAA+NON-PROBE: NOT DETECTED
PH UR STRIP: 5 [PH] (ref 5–8)
PHOSPHATE SERPL-MCNC: 2.7 MG/DL (ref 2.7–4.5)
PLATELET # BLD AUTO: 259 K/UL (ref 150–450)
PMV BLD AUTO: 11.1 FL (ref 9.2–12.9)
POTASSIUM SERPL-SCNC: 4.2 MMOL/L (ref 3.5–5.1)
PROT CSF-MCNC: 551 MG/DL (ref 15–40)
PROT SERPL-MCNC: 6.7 G/DL (ref 6–8.4)
PROT UR QL STRIP: ABNORMAL
PROTEUS SPECIES: NOT DETECTED
PSEUDOMONAS AERUGINOSA: NOT DETECTED
RBC # BLD AUTO: 4.28 M/UL (ref 4.6–6.2)
RBC # CSF: 345 /CU MM
RBC #/AREA URNS AUTO: 66 /HPF (ref 0–4)
S PNEUM DNA CSF QL NAA+NON-PROBE: DETECTED
SALMONELLA SP: NOT DETECTED
SERRATIA MARCESCENS: NOT DETECTED
SODIUM SERPL-SCNC: 140 MMOL/L (ref 136–145)
SP GR UR STRIP: >1.03 (ref 1–1.03)
SPECIMEN VOL CSF: 0.5 ML
SQUAMOUS #/AREA URNS AUTO: 2 /HPF
STAPHYLOCOCCUS AUREUS: NOT DETECTED
STAPHYLOCOCCUS EPIDERMIDIS: NOT DETECTED
STAPHYLOCOCCUS LUGDUNESIS: NOT DETECTED
STAPHYLOCOCCUS SPECIES: NOT DETECTED
STENOTROPHOMONAS MALTOPHILIA: NOT DETECTED
STREPTOCOCCUS AGALACTIAE: NOT DETECTED
STREPTOCOCCUS PNEUMONIAE: DETECTED
STREPTOCOCCUS PYOGENES: NOT DETECTED
STREPTOCOCCUS SPECIES: ABNORMAL
URN SPEC COLLECT METH UR: ABNORMAL
VAN A/B: NOT DETECTED
VIM GENE: NOT DETECTED
VZV DNA CSF QL NAA+NON-PROBE: NOT DETECTED
WBC # BLD AUTO: 29.85 K/UL (ref 3.9–12.7)
WBC # CSF: 3606 /CU MM (ref 0–5)
WBC #/AREA URNS AUTO: 2 /HPF (ref 0–5)
WBC CLUMPS UR QL AUTO: ABNORMAL
YEAST UR QL AUTO: ABNORMAL

## 2023-05-28 PROCEDURE — 36415 COLL VENOUS BLD VENIPUNCTURE: CPT | Performed by: STUDENT IN AN ORGANIZED HEALTH CARE EDUCATION/TRAINING PROGRAM

## 2023-05-28 PROCEDURE — 87498 ENTEROVIRUS PROBE&REVRS TRNS: CPT | Performed by: STUDENT IN AN ORGANIZED HEALTH CARE EDUCATION/TRAINING PROGRAM

## 2023-05-28 PROCEDURE — 25000003 PHARM REV CODE 250: Performed by: HOSPITALIST

## 2023-05-28 PROCEDURE — 87186 SC STD MICRODIL/AGAR DIL: CPT | Performed by: STUDENT IN AN ORGANIZED HEALTH CARE EDUCATION/TRAINING PROGRAM

## 2023-05-28 PROCEDURE — 99223 1ST HOSP IP/OBS HIGH 75: CPT | Mod: ,,, | Performed by: INTERNAL MEDICINE

## 2023-05-28 PROCEDURE — 87483 CNS DNA AMP PROBE TYPE 12-25: CPT | Performed by: STUDENT IN AN ORGANIZED HEALTH CARE EDUCATION/TRAINING PROGRAM

## 2023-05-28 PROCEDURE — 99223 PR INITIAL HOSPITAL CARE,LEVL III: ICD-10-PCS | Mod: ,,, | Performed by: HOSPITALIST

## 2023-05-28 PROCEDURE — 84157 ASSAY OF PROTEIN OTHER: CPT | Performed by: STUDENT IN AN ORGANIZED HEALTH CARE EDUCATION/TRAINING PROGRAM

## 2023-05-28 PROCEDURE — 82945 GLUCOSE OTHER FLUID: CPT | Performed by: STUDENT IN AN ORGANIZED HEALTH CARE EDUCATION/TRAINING PROGRAM

## 2023-05-28 PROCEDURE — 87070 CULTURE OTHR SPECIMN AEROBIC: CPT | Performed by: STUDENT IN AN ORGANIZED HEALTH CARE EDUCATION/TRAINING PROGRAM

## 2023-05-28 PROCEDURE — 63600175 PHARM REV CODE 636 W HCPCS: Performed by: STUDENT IN AN ORGANIZED HEALTH CARE EDUCATION/TRAINING PROGRAM

## 2023-05-28 PROCEDURE — 94761 N-INVAS EAR/PLS OXIMETRY MLT: CPT

## 2023-05-28 PROCEDURE — 99223 PR INITIAL HOSPITAL CARE,LEVL III: ICD-10-PCS | Mod: ,,, | Performed by: INTERNAL MEDICINE

## 2023-05-28 PROCEDURE — 25000003 PHARM REV CODE 250: Performed by: STUDENT IN AN ORGANIZED HEALTH CARE EDUCATION/TRAINING PROGRAM

## 2023-05-28 PROCEDURE — 83735 ASSAY OF MAGNESIUM: CPT | Performed by: STUDENT IN AN ORGANIZED HEALTH CARE EDUCATION/TRAINING PROGRAM

## 2023-05-28 PROCEDURE — 99223 1ST HOSP IP/OBS HIGH 75: CPT | Mod: ,,, | Performed by: HOSPITALIST

## 2023-05-28 PROCEDURE — 84100 ASSAY OF PHOSPHORUS: CPT | Performed by: STUDENT IN AN ORGANIZED HEALTH CARE EDUCATION/TRAINING PROGRAM

## 2023-05-28 PROCEDURE — 85025 COMPLETE CBC W/AUTO DIFF WBC: CPT | Performed by: STUDENT IN AN ORGANIZED HEALTH CARE EDUCATION/TRAINING PROGRAM

## 2023-05-28 PROCEDURE — 63600175 PHARM REV CODE 636 W HCPCS: Performed by: HOSPITALIST

## 2023-05-28 PROCEDURE — 11000001 HC ACUTE MED/SURG PRIVATE ROOM

## 2023-05-28 PROCEDURE — 80053 COMPREHEN METABOLIC PANEL: CPT | Performed by: STUDENT IN AN ORGANIZED HEALTH CARE EDUCATION/TRAINING PROGRAM

## 2023-05-28 PROCEDURE — 99000 SPECIMEN HANDLING OFFICE-LAB: CPT | Performed by: STUDENT IN AN ORGANIZED HEALTH CARE EDUCATION/TRAINING PROGRAM

## 2023-05-28 PROCEDURE — 87205 SMEAR GRAM STAIN: CPT | Performed by: STUDENT IN AN ORGANIZED HEALTH CARE EDUCATION/TRAINING PROGRAM

## 2023-05-28 PROCEDURE — 25000003 PHARM REV CODE 250: Performed by: EMERGENCY MEDICINE

## 2023-05-28 PROCEDURE — 87040 BLOOD CULTURE FOR BACTERIA: CPT | Mod: 59 | Performed by: STUDENT IN AN ORGANIZED HEALTH CARE EDUCATION/TRAINING PROGRAM

## 2023-05-28 PROCEDURE — 89051 BODY FLUID CELL COUNT: CPT | Performed by: STUDENT IN AN ORGANIZED HEALTH CARE EDUCATION/TRAINING PROGRAM

## 2023-05-28 PROCEDURE — 87529 HSV DNA AMP PROBE: CPT | Performed by: STUDENT IN AN ORGANIZED HEALTH CARE EDUCATION/TRAINING PROGRAM

## 2023-05-28 RX ORDER — POLYETHYLENE GLYCOL 3350 17 G/17G
17 POWDER, FOR SOLUTION ORAL 2 TIMES DAILY PRN
Status: DISCONTINUED | OUTPATIENT
Start: 2023-05-28 | End: 2023-05-31 | Stop reason: HOSPADM

## 2023-05-28 RX ORDER — PROCHLORPERAZINE EDISYLATE 5 MG/ML
5 INJECTION INTRAMUSCULAR; INTRAVENOUS EVERY 6 HOURS PRN
Status: DISCONTINUED | OUTPATIENT
Start: 2023-05-28 | End: 2023-05-31 | Stop reason: HOSPADM

## 2023-05-28 RX ORDER — ONDANSETRON 2 MG/ML
4 INJECTION INTRAMUSCULAR; INTRAVENOUS
Status: COMPLETED | OUTPATIENT
Start: 2023-05-28 | End: 2023-05-28

## 2023-05-28 RX ORDER — TALC
6 POWDER (GRAM) TOPICAL NIGHTLY PRN
Status: DISCONTINUED | OUTPATIENT
Start: 2023-05-28 | End: 2023-05-31 | Stop reason: HOSPADM

## 2023-05-28 RX ORDER — SODIUM CHLORIDE 0.9 % (FLUSH) 0.9 %
10 SYRINGE (ML) INJECTION EVERY 6 HOURS PRN
Status: DISCONTINUED | OUTPATIENT
Start: 2023-05-28 | End: 2023-05-31 | Stop reason: HOSPADM

## 2023-05-28 RX ORDER — ACETAMINOPHEN 325 MG/1
650 TABLET ORAL EVERY 4 HOURS PRN
Status: DISCONTINUED | OUTPATIENT
Start: 2023-05-28 | End: 2023-05-30

## 2023-05-28 RX ORDER — ONDANSETRON 2 MG/ML
4 INJECTION INTRAMUSCULAR; INTRAVENOUS ONCE AS NEEDED
Status: DISCONTINUED | OUTPATIENT
Start: 2023-05-28 | End: 2023-05-31 | Stop reason: HOSPADM

## 2023-05-28 RX ORDER — AMOXICILLIN 250 MG
1 CAPSULE ORAL DAILY PRN
Status: DISCONTINUED | OUTPATIENT
Start: 2023-05-28 | End: 2023-05-31 | Stop reason: HOSPADM

## 2023-05-28 RX ORDER — NALOXONE HCL 0.4 MG/ML
0.02 VIAL (ML) INJECTION
Status: DISCONTINUED | OUTPATIENT
Start: 2023-05-28 | End: 2023-05-31 | Stop reason: HOSPADM

## 2023-05-28 RX ORDER — DEXAMETHASONE SODIUM PHOSPHATE 4 MG/ML
10 INJECTION, SOLUTION INTRA-ARTICULAR; INTRALESIONAL; INTRAMUSCULAR; INTRAVENOUS; SOFT TISSUE EVERY 6 HOURS
Status: COMPLETED | OUTPATIENT
Start: 2023-05-28 | End: 2023-05-31

## 2023-05-28 RX ORDER — ONDANSETRON 2 MG/ML
4 INJECTION INTRAMUSCULAR; INTRAVENOUS EVERY 8 HOURS PRN
Status: DISCONTINUED | OUTPATIENT
Start: 2023-05-28 | End: 2023-05-31 | Stop reason: HOSPADM

## 2023-05-28 RX ORDER — ONDANSETRON 2 MG/ML
INJECTION INTRAMUSCULAR; INTRAVENOUS
Status: COMPLETED
Start: 2023-05-28 | End: 2023-05-28

## 2023-05-28 RX ORDER — SODIUM CHLORIDE 9 MG/ML
INJECTION, SOLUTION INTRAVENOUS CONTINUOUS
Status: DISCONTINUED | OUTPATIENT
Start: 2023-05-28 | End: 2023-05-28

## 2023-05-28 RX ADMIN — ACYCLOVIR SODIUM 780 MG: 1000 INJECTION, SOLUTION INTRAVENOUS at 01:05

## 2023-05-28 RX ADMIN — CEFTRIAXONE 2 G: 2 INJECTION, POWDER, FOR SOLUTION INTRAMUSCULAR; INTRAVENOUS at 12:05

## 2023-05-28 RX ADMIN — ONDANSETRON 4 MG: 2 INJECTION INTRAMUSCULAR; INTRAVENOUS at 12:05

## 2023-05-28 RX ADMIN — ACETAMINOPHEN 650 MG: 325 TABLET ORAL at 08:05

## 2023-05-28 RX ADMIN — ACETAMINOPHEN 650 MG: 325 TABLET ORAL at 02:05

## 2023-05-28 RX ADMIN — DEXAMETHASONE SODIUM PHOSPHATE 10 MG: 4 INJECTION INTRA-ARTICULAR; INTRALESIONAL; INTRAMUSCULAR; INTRAVENOUS; SOFT TISSUE at 11:05

## 2023-05-28 RX ADMIN — VANCOMYCIN HYDROCHLORIDE 1500 MG: 1.5 INJECTION, POWDER, LYOPHILIZED, FOR SOLUTION INTRAVENOUS at 04:05

## 2023-05-28 RX ADMIN — VANCOMYCIN HYDROCHLORIDE 1250 MG: 1.25 INJECTION, POWDER, LYOPHILIZED, FOR SOLUTION INTRAVENOUS at 03:05

## 2023-05-28 RX ADMIN — DEXAMETHASONE SODIUM PHOSPHATE 10 MG: 4 INJECTION INTRA-ARTICULAR; INTRALESIONAL; INTRAMUSCULAR; INTRAVENOUS; SOFT TISSUE at 05:05

## 2023-05-28 RX ADMIN — LIDOCAINE HYDROCHLORIDE 1 ML: 10 INJECTION, SOLUTION INFILTRATION; PERINEURAL at 12:05

## 2023-05-28 RX ADMIN — PROCHLORPERAZINE EDISYLATE 5 MG: 5 INJECTION INTRAMUSCULAR; INTRAVENOUS at 08:05

## 2023-05-28 RX ADMIN — DEXAMETHASONE SODIUM PHOSPHATE 10 MG: 4 INJECTION INTRA-ARTICULAR; INTRALESIONAL; INTRAMUSCULAR; INTRAVENOUS; SOFT TISSUE at 01:05

## 2023-05-28 RX ADMIN — SODIUM CHLORIDE: 9 INJECTION, SOLUTION INTRAVENOUS at 01:05

## 2023-05-28 RX ADMIN — Medication 6 MG: at 08:05

## 2023-05-28 RX ADMIN — ACETAMINOPHEN 650 MG: 325 TABLET ORAL at 03:05

## 2023-05-28 RX ADMIN — ACYCLOVIR SODIUM 780 MG: 1000 INJECTION, SOLUTION INTRAVENOUS at 08:05

## 2023-05-28 RX ADMIN — CEFTRIAXONE 2 G: 2 INJECTION, POWDER, FOR SOLUTION INTRAMUSCULAR; INTRAVENOUS at 11:05

## 2023-05-28 NOTE — CONSULTS
[facility] - Emergency Dept  Neurosurgery  Consult Note    Inpatient consult to Neurosurgery  Consult performed by: Tyson Ovalle MD  Consult ordered by: Nate Loera MD        Subjective:     Chief Complaint/Reason for Admission: HA, N/V, fatigue, chills, confusion    History of Present Illness: Pt is 42M no significant pmh, donates plasma frequently, presents with 3 days increased confusion, HA, N/V, fevers, chills, body aches and generalized weakness. White count is 26 with elevated procalcitonin. CTH showed increased ventricular size. Pt has positive kernig sign. Concern for possible meningitis.      (Not in a hospital admission)      Review of patient's allergies indicates:   Allergen Reactions    Sulfa (sulfonamide antibiotics) Other (See Comments)     Unknown per pt       History reviewed. No pertinent past medical history.  History reviewed. No pertinent surgical history.  Family History    None       Tobacco Use    Smoking status: Every Day     Packs/day: 0.50     Types: Cigarettes    Smokeless tobacco: Never   Substance and Sexual Activity    Alcohol use: Yes    Drug use: Yes     Types: Marijuana    Sexual activity: Not on file     Review of Systems   Constitutional:  Positive for chills, fatigue and fever. Negative for activity change.   HENT:  Negative for hearing loss and trouble swallowing.    Eyes:  Positive for photophobia. Negative for visual disturbance.   Respiratory:  Negative for shortness of breath.    Cardiovascular:  Negative for chest pain.   Gastrointestinal:  Positive for nausea and vomiting.   Genitourinary:  Negative for difficulty urinating.   Musculoskeletal:  Positive for back pain, neck pain and neck stiffness.   Neurological:  Positive for dizziness, light-headedness and headaches. Negative for tremors and weakness.   Psychiatric/Behavioral:  Positive for confusion.    Objective:     Weight: 70.3 kg (155 lb)  Body mass index is 21.02 kg/m².  Vital Signs (Most  Recent):  Temp: 99 °F (37.2 °C) (05/27/23 2152)  Pulse: 62 (05/27/23 2130)  Resp: 20 (05/27/23 2130)  BP: 108/73 (05/27/23 2130)  SpO2: 96 % (05/27/23 2130) Vital Signs (24h Range):  Temp:  [98.3 °F (36.8 °C)-99 °F (37.2 °C)] 99 °F (37.2 °C)  Pulse:  [62-76] 62  Resp:  [18-20] 20  SpO2:  [96 %-99 %] 96 %  BP: (108-120)/(63-73) 108/73     Date 05/27/23 0700 - 05/28/23 0659   Shift 4949-5380 7635-6168 1480-8999 24 Hour Total   INTAKE   IV Piggyback  2000 2000   Shift Total(mL/kg)  2000(28.4)  2000(28.4)   OUTPUT   Shift Total(mL/kg)       Weight (kg)  70.3 70.3 70.3                               Physical Exam         Neurosurgery Physical Exam  General: well developed, well nourished, distressed  Head: normocephalic, atraumatic  CV: RRR, pulses equal bilateral  Pulmonary: normal respirations, no signs of respiratory distress  Abdomen: soft, non-distended  Skin: Skin is warm, dry and intact.    Neuro:  E3V5M6  Awake, intermittently drowsy, Ox4  PERRL, EOMI  CNII-XII grossly intact  Motor: Follows commands full strength x4  SILT  Positive kernig and Brudzinski    Significant Labs:  Recent Labs   Lab 05/27/23 1811   *      K 3.8      CO2 24   BUN 17   CREATININE 0.8   CALCIUM 10.1     Recent Labs   Lab 05/27/23 1811   WBC 26.04*   HGB 11.1*   HCT 34.3*        No results for input(s): LABPT, INR, APTT in the last 48 hours.  Microbiology Results (last 7 days)       Procedure Component Value Units Date/Time    Blood culture x two cultures. Draw prior to antibiotics. [[id number]] Collected: 05/27/23 2052    Order Status: Sent Specimen: Blood from Peripheral, Forearm, Left Updated: 05/27/23 2056    Blood culture x two cultures. Draw prior to antibiotics. [[id number]] Collected: 05/27/23 1917    Order Status: Sent Specimen: Blood from Peripheral, Antecubital, Left Updated: 05/27/23 1951    Influenza A & B by Molecular [[id number]] Collected: 05/27/23 1905    Order Status: Completed Specimen:  Nasopharyngeal Swab Updated: 05/27/23 1938     Influenza A, Molecular Negative     Influenza B, Molecular Negative     Flu A & B Source Nasal swab          All pertinent labs from the last 24 hours have been reviewed.    Significant Diagnostics:  I have reviewed all pertinent imaging results/findings within the past 24 hours.  I have reviewed and interpreted all pertinent imaging results/findings within the past 24 hours.    Assessment/Plan:     Abnormal CT of the head  Pt is 42M no significant pmh, donates plasma frequently, presents with 3 days increased confusion, HA, N/V, fevers, chills, body aches and generalized weakness. White count is 26 with elevated procalcitonin. CTH showed increased ventricular size. Pt has positive kernig sign. Concern for possible meningitis.    Plan:  No surgical intervention indicated  HM admission  Recommend full workup for meningitis  Recommend LP, blood cultures, then antibiotics and steroids with neurology consultation  Will continue to monitor        Thank you for your consult. I will follow-up with patient. Please contact us if you have any additional questions.    Tyson Ovalle MD  Neurosurgery  [facility] - Emergency Dept

## 2023-05-28 NOTE — HPI
43 y/o AAM hx of Tobacco abuse, presents to the ED with complaints of constitutional symptoms. Patient reported myalgias, HA, nausea/vomiting, chills.  He had concerns he might have covid.  Patient has had extensive w/u in ED with CT head/neck, CT chest abd/pelvis.  CXR w/o pneumonia, no flu/covid/rsv    CT head imaging found with mild hydrocephalus, seen by neurosurgery, recommends further infectious w/u with lumbar puncutre and empiric meningitis treatment.  ED performed LP.  Patient continues to feel poorly.  He has not had cigarettes in the last week, no alcohol use, occasional marijuana use.  He takes no prescription medications.  Sulfa allergy listed.     MRI brain is pending.

## 2023-05-28 NOTE — H&P
[facility] - Emergency Dept  [facility] Medicine  History & Physical    Patient Name: Jesus Jean  MRN: [id number]  Patient Class: IP- Inpatient  Admission Date: 5/27/2023  Attending Physician: Nabil Ardon MD [facility] [place] [facility] MED N  Admitting Physician: Jorge Guajardo MD  Primary Care Provider: Primary Doctor No     Patient information was obtained from patient, past medical records and ER records.     Subjective:     Principal Problem:Meningitis    Chief Complaint:   Chief Complaint   Patient presents with    COVID-19 Concerns     Body aches, chills        HPI: 43 y/o AAM hx of Tobacco abuse, presents to the ED with complaints of constitutional symptoms. Patient reported myalgias, HA, nausea/vomiting, chills.  He had concerns he might have covid.  Patient has had extensive w/u in ED with CT head/neck, CT chest abd/pelvis.  CXR w/o pneumonia, no flu/covid/rsv    CT head imaging found with mild hydrocephalus, seen by neurosurgery, recommends further infectious w/u with lumbar puncutre and empiric meningitis treatment.  ED performed LP.  Patient continues to feel poorly.  He has not had cigarettes in the last week, no alcohol use, occasional marijuana use.  He takes no prescription medications.  Sulfa allergy listed.     MRI brain is pending.       History reviewed. No pertinent past medical history.    History reviewed. No pertinent surgical history.    Review of patient's allergies indicates:   Allergen Reactions    Sulfa (sulfonamide antibiotics) Other (See Comments)     Unknown per pt       No current facility-administered medications on file prior to encounter.     No current outpatient medications on file prior to encounter.     Family History    None       Tobacco Use    Smoking status: Every Day     Packs/day: 0.50     Types: Cigarettes    Smokeless tobacco: Never   Substance and Sexual Activity    Alcohol use: Yes    Drug use: Yes     Types: Marijuana    Sexual activity: Not on file     Review of  Systems   Constitutional:  Positive for chills and fatigue. Negative for activity change.   HENT:  Negative for sore throat and trouble swallowing.    Eyes:  Positive for photophobia.   Respiratory:  Negative for wheezing.    Cardiovascular:  Negative for chest pain and leg swelling.   Gastrointestinal:  Positive for nausea.   Genitourinary:  Negative for dysuria.   Skin:  Negative for rash.   Neurological:  Positive for weakness and headaches.   Objective:     Vital Signs (Most Recent):  Temp: 99 °F (37.2 °C) (05/27/23 2152)  Pulse: (!) 59 (05/27/23 2330)  Resp: 20 (05/27/23 2330)  BP: 110/60 (05/27/23 2330)  SpO2: 95 % (05/27/23 2330) Vital Signs (24h Range):  Temp:  [98.3 °F (36.8 °C)-99 °F (37.2 °C)] 99 °F (37.2 °C)  Pulse:  [59-76] 59  Resp:  [18-36] 20  SpO2:  [93 %-99 %] 95 %  BP: (108-120)/(60-73) 110/60     Weight: 70.3 kg (155 lb)  Body mass index is 21.02 kg/m².     Physical Exam  Vitals and nursing note reviewed.   Constitutional:       Appearance: He is ill-appearing.   HENT:      Head: Normocephalic and atraumatic.   Eyes:      General: No scleral icterus.  Cardiovascular:      Rate and Rhythm: Normal rate and regular rhythm.   Pulmonary:      Effort: Pulmonary effort is normal. No respiratory distress.      Breath sounds: No wheezing or rales.   Abdominal:      General: Bowel sounds are normal. There is no distension.      Palpations: Abdomen is soft.      Tenderness: There is no guarding.   Musculoskeletal:      Cervical back: Neck supple.      Right lower leg: No edema.      Left lower leg: No edema.   Skin:     General: Skin is warm and dry.   Neurological:      Mental Status: He is alert.      Motor: Weakness present.              Significant Labs: All pertinent labs within the past 24 hours have been reviewed.  CBC:   Recent Labs   Lab 05/27/23 1811   WBC 26.04*   HGB 11.1*   HCT 34.3*        CMP:   Recent Labs   Lab 05/27/23 1811      K 3.8      CO2 24   *   BUN 17    CREATININE 0.8   CALCIUM 10.1   PROT 7.8   ALBUMIN 2.7*   BILITOT 0.8   ALKPHOS 94   AST 32   ALT 29   ANIONGAP 12     Cardiac Markers:   Recent Labs   Lab 05/27/23 1917   *     Coagulation: No results for input(s): PT, INR, APTT in the last 48 hours.  Lactic Acid: No results for input(s): LACTATE in the last 48 hours.  Lipase:   Recent Labs   Lab 05/27/23 1917   LIPASE 7     Magnesium: No results for input(s): MG in the last 48 hours.    Significant Imaging: I have reviewed all pertinent imaging results/findings within the past 24 hours.    CT CHEST ABDOMEN PELVIS WITH CONTRAST (XPD)     CLINICAL HISTORY:  Polytrauma, blunt;     TECHNIQUE:  Low dose axial images, sagittal and coronal reformations were obtained from the neck base to the pubic symphysis after the administration of 75 cc Omnipaque 350 intravenous contrast.  No oral contrast was administered.     COMPARISON:  CT chest abdomen pelvis 09/10/2019.     FINDINGS:  Evaluation is limited by extensive streak artifact due to the patient's arms overlying the field of view.  Exam quality also limited by motion.     Base of Neck: No significant abnormality.     CHEST:     -Heart: Normal size. No pericardial effusion.     -Pulmonary vasculature: Pulmonary arteries distribute normally.  There are four pulmonary veins.     -Ofelia/Mediastinum: No pathologic serenity enlargement.     -Trachea and Proximal airways: Patent.     -Lungs/Pleura: Symmetrically expanded without consolidation, pneumothorax, or mass.  No pleural effusion or thickening.     -Esophagus: Normal course and caliber.     ABDOMEN:     Detailed evaluation of the abdominal organs is limited by extensive artifact related to the patient's arms overlying the field of view.  Exam quality is also limited by motion.     - Liver: The liver is enlarged measuring 20.7 cm.  Questionable mild steatosis.  No focal hepatic lesions.     - Gallbladder: No calcified gallstones.  No wall thickening or  pericholecystic fluid.     - Bile Ducts: No intra or extrahepatic biliary ductal dilatation.     - Stomach/Duodenum: Unremarkable.     - Spleen: Unremarkable.     - Pancreas: Unremarkable.     - Adrenals: Unremarkable.     - Kidneys/ureters/urinary bladder: Normal in size and location, concentrating excreting contrast appropriately on delayed imaging.  No hydronephrosis or stones.  The ureters appear normal in course and caliber without evidence of ureteral dilatation.  The urinary bladder is unremarkable.     - Retroperitoneum: No significant adenopathy.     PELVIS:     - Reproductive: The prostate is unremarkable.     - Other: No pelvic adenopathy, free fluid, or mass.     BOWEL/MESENTERY:     No evidence of bowel obstruction or inflammatory process.     VASCULATURE: Left-sided aortic arch with 3 branch vessels.  No aneurysm and no significant atherosclerosis.  The celiac artery, SMA, bilateral renal arteries, and CHASE are patent.  The portal vein, SMV, and splenic vein are patent.     BONES: No acute fracture or bony destructive process.     EXTRATHORACIC/EXTRAPERITONEAL SOFT TISSUES: Unremarkable.     Impression:     1. No acute traumatic abnormality identified in the chest, abdomen, or pelvis.  2. Hepatomegaly.  3. Motion and artifact limited study.     Electronically signed by resident: Kiara Nieto  Date:                                            05/27/2023  Time:                                           20:59     Electronically signed by: Morro Clancy MD  Date:                                            05/27/2023  Time:                                           22:06    CT HEAD WITHOUT CONTRAST; CT CERVICAL SPINE WITHOUT CONTRAST     CLINICAL HISTORY:  Head trauma, moderate-severe;; Polytrauma, blunt;     TECHNIQUE:  Low dose axial CT images obtained throughout the head and cervical spine without the use of intravenous contrast.  Axial, sagittal and coronal reconstructions were performed.      COMPARISON:  CT head 09/10/2019 and CT cervical spine 09/10/2019.     FINDINGS:  CT head:     Prominence of the ventricles, most pronounced in the temporal horns and disproportionate to the degree of prominence of the sulci.  Overall prominence of the ventricles is increased when compared to CT head from 09/10/2019 and consistent with mild hydrocephalus.     Focal encephalomalacia involving the paramedian aspect of the anterior left frontal lobe and less conspicuously in the anterior right frontal lobe, similar to prior.  No parenchymal mass, hemorrhage, edema or major vascular distribution infarct.     No extra-axial blood or fluid collections.     No fracture. Mastoid air cells and paranasal sinuses are essentially clear.     CT cervical spine:     Alignment: Normal.     Vertebrae: The dens, lateral masses, and occipital condyles are intact.  No fracture.     Discs: Normal height.     Degenerative findings: Small posterior disc osteophyte complex at C3-C4 and C5-C6.  Findings result in mild spinal canal stenosis at C3-C4.  No high-grade neural foraminal narrowing.     Paraspinal muscles & soft tissues: Mild biapical emphysematous changes.  No evidence of a pneumothorax.     Impression:     1. New mild hydrocephalus compared to CT head from 09/10/2019.  Recommend further evaluation with MRI.  2. No acute fracture or traumatic malalignment of the cervical spine.  3. Stable encephalomalacia in the anterior bilateral frontal lobes, unchanged compared to CT head from 09/10/2019.  4. Degenerative changes of the cervical spine most pronounced at C3-C4 with mild spinal canal stenosis, similar to CT cervical spine from 09/10/2018.  This report was flagged in Epic as abnormal.     Electronically signed by resident: Kiara Nieto  Date:                                            05/27/2023  Time:                                           20:46     Electronically signed by: Robbi Garber MD  Date:                                             05/27/2023  Time:                                           21:14        Assessment/Plan:     * Meningitis  Concern for meningitis  -s/p LP in ED  -Start empiric bacterial meningitis coverage - Vancomycin & Ceftriaxone   -Start empiric Dexamethasone for bacterial meningitis - 10mg IV q6hrs.  Discontinue if bacterial meningitis ruled out.   -Empiric IV Acyclovir   -Start Contact Airborne precautions - pending results of CSF studies  -blood cultures obtained.   -Empiric Glucocorticoids   -Add On HSV CSF and Enterocvirus  -ID consult to assist in w/u & management.       VTE Risk Mitigation (From admission, onward)         Ordered     IP VTE LOW RISK PATIENT  Once         05/28/23 0047     Place sequential compression device  Until discontinued         05/28/23 0047                           Jorge Guajardo MD  Department of Hospital Medicine  [facility] - Emergency Dept

## 2023-05-28 NOTE — ED TRIAGE NOTES
"Jesus Jean, a 42 y.o. male presents to the ED w/ complaint of body aches and chills. Pt complaint of 10/10 pain. Pt reports nausea and the urge to vomit but can't. Pt is SOB on exertion and has "overall weakness."    Triage note:  Chief Complaint   Patient presents with    COVID-19 Concerns     Body aches, chills     Review of patient's allergies indicates:   Allergen Reactions    Sulfa (sulfonamide antibiotics) Other (See Comments)     Unknown per pt     History reviewed. No pertinent past medical history.   Patient identifiers for Jesus Jean checked and correct.    LOC: The patient is awake, alert and aware of environment with an appropriate affect, the patient is oriented x 4 and speaking appropriately.    APPEARANCE: Patient resting comfortably and in no acute distress, patient is clean and well groomed, patient's clothing is properly fastened.    SKIN: The skin is warm and diaphoretic.    MUSCULOSKELETAL: Patient moving all extremities well, but is having some generalized weakness.    RESPIRATORY: Airway is open and patent, respirations are spontaneous and even, patient has a normal effort and rate.    CARDIAC: Patient has a normal rate and rhythm, no periphreal edema noted, capillary refill < 3 seconds. Normal +2 pedal pulses present.    ABDOMEN: Soft and non tender to palpation, no distention noted. Patient denies any nausea, vomiting, diarrhea, or constipation.     NEUROLOGIC: Eyes open spontaneously, PERRL, behavior appropriate to situation, follows commands, facial expression symmetrical, bilateral hand grasp equal and even, purposeful motor response noted, normal sensation in all extremities.     HEENT: No abnormalities noted. White sclera and pupils equal round and reactive to light. Denies headache, dizziness.          "

## 2023-05-28 NOTE — SUBJECTIVE & OBJECTIVE
History reviewed. No pertinent past medical history.    History reviewed. No pertinent surgical history.    Review of patient's allergies indicates:   Allergen Reactions    Sulfa (sulfonamide antibiotics) Other (See Comments)     Unknown per pt       No current facility-administered medications on file prior to encounter.     No current outpatient medications on file prior to encounter.     Family History    None       Tobacco Use    Smoking status: Every Day     Packs/day: 0.50     Types: Cigarettes    Smokeless tobacco: Never   Substance and Sexual Activity    Alcohol use: Yes    Drug use: Yes     Types: Marijuana    Sexual activity: Not on file     Review of Systems   Constitutional:  Positive for chills and fatigue. Negative for activity change.   HENT:  Negative for sore throat and trouble swallowing.    Eyes:  Positive for photophobia.   Respiratory:  Negative for wheezing.    Cardiovascular:  Negative for chest pain and leg swelling.   Gastrointestinal:  Positive for nausea.   Genitourinary:  Negative for dysuria.   Skin:  Negative for rash.   Neurological:  Positive for weakness and headaches.   Objective:     Vital Signs (Most Recent):  Temp: 99 °F (37.2 °C) (05/27/23 2152)  Pulse: (!) 59 (05/27/23 2330)  Resp: 20 (05/27/23 2330)  BP: 110/60 (05/27/23 2330)  SpO2: 95 % (05/27/23 2330) Vital Signs (24h Range):  Temp:  [98.3 °F (36.8 °C)-99 °F (37.2 °C)] 99 °F (37.2 °C)  Pulse:  [59-76] 59  Resp:  [18-36] 20  SpO2:  [93 %-99 %] 95 %  BP: (108-120)/(60-73) 110/60     Weight: 70.3 kg (155 lb)  Body mass index is 21.02 kg/m².     Physical Exam  Vitals and nursing note reviewed.   Constitutional:       Appearance: He is ill-appearing.   HENT:      Head: Normocephalic and atraumatic.   Eyes:      General: No scleral icterus.  Cardiovascular:      Rate and Rhythm: Normal rate and regular rhythm.   Pulmonary:      Effort: Pulmonary effort is normal. No respiratory distress.      Breath sounds: No wheezing or rales.    Abdominal:      General: Bowel sounds are normal. There is no distension.      Palpations: Abdomen is soft.      Tenderness: There is no guarding.   Musculoskeletal:      Cervical back: Neck supple.      Right lower leg: No edema.      Left lower leg: No edema.   Skin:     General: Skin is warm and dry.   Neurological:      Mental Status: He is alert.      Motor: Weakness present.              Significant Labs: All pertinent labs within the past 24 hours have been reviewed.  CBC:   Recent Labs   Lab 05/27/23 1811   WBC 26.04*   HGB 11.1*   HCT 34.3*        CMP:   Recent Labs   Lab 05/27/23 1811      K 3.8      CO2 24   *   BUN 17   CREATININE 0.8   CALCIUM 10.1   PROT 7.8   ALBUMIN 2.7*   BILITOT 0.8   ALKPHOS 94   AST 32   ALT 29   ANIONGAP 12     Cardiac Markers:   Recent Labs   Lab 05/27/23 1917   *     Coagulation: No results for input(s): PT, INR, APTT in the last 48 hours.  Lactic Acid: No results for input(s): LACTATE in the last 48 hours.  Lipase:   Recent Labs   Lab 05/27/23 1917   LIPASE 7     Magnesium: No results for input(s): MG in the last 48 hours.    Significant Imaging: I have reviewed all pertinent imaging results/findings within the past 24 hours.    CT CHEST ABDOMEN PELVIS WITH CONTRAST (XPD)     CLINICAL HISTORY:  Polytrauma, blunt;     TECHNIQUE:  Low dose axial images, sagittal and coronal reformations were obtained from the neck base to the pubic symphysis after the administration of 75 cc Omnipaque 350 intravenous contrast.  No oral contrast was administered.     COMPARISON:  CT chest abdomen pelvis 09/10/2019.     FINDINGS:  Evaluation is limited by extensive streak artifact due to the patient's arms overlying the field of view.  Exam quality also limited by motion.     Base of Neck: No significant abnormality.     CHEST:     -Heart: Normal size. No pericardial effusion.     -Pulmonary vasculature: Pulmonary arteries distribute normally.  There are  four pulmonary veins.     -Ofelia/Mediastinum: No pathologic serenity enlargement.     -Trachea and Proximal airways: Patent.     -Lungs/Pleura: Symmetrically expanded without consolidation, pneumothorax, or mass.  No pleural effusion or thickening.     -Esophagus: Normal course and caliber.     ABDOMEN:     Detailed evaluation of the abdominal organs is limited by extensive artifact related to the patient's arms overlying the field of view.  Exam quality is also limited by motion.     - Liver: The liver is enlarged measuring 20.7 cm.  Questionable mild steatosis.  No focal hepatic lesions.     - Gallbladder: No calcified gallstones.  No wall thickening or pericholecystic fluid.     - Bile Ducts: No intra or extrahepatic biliary ductal dilatation.     - Stomach/Duodenum: Unremarkable.     - Spleen: Unremarkable.     - Pancreas: Unremarkable.     - Adrenals: Unremarkable.     - Kidneys/ureters/urinary bladder: Normal in size and location, concentrating excreting contrast appropriately on delayed imaging.  No hydronephrosis or stones.  The ureters appear normal in course and caliber without evidence of ureteral dilatation.  The urinary bladder is unremarkable.     - Retroperitoneum: No significant adenopathy.     PELVIS:     - Reproductive: The prostate is unremarkable.     - Other: No pelvic adenopathy, free fluid, or mass.     BOWEL/MESENTERY:     No evidence of bowel obstruction or inflammatory process.     VASCULATURE: Left-sided aortic arch with 3 branch vessels.  No aneurysm and no significant atherosclerosis.  The celiac artery, SMA, bilateral renal arteries, and CHASE are patent.  The portal vein, SMV, and splenic vein are patent.     BONES: No acute fracture or bony destructive process.     EXTRATHORACIC/EXTRAPERITONEAL SOFT TISSUES: Unremarkable.     Impression:     1. No acute traumatic abnormality identified in the chest, abdomen, or pelvis.  2. Hepatomegaly.  3. Motion and artifact limited study.      Electronically signed by resident: Kiara Nieto  Date:                                            05/27/2023  Time:                                           20:59     Electronically signed by: Morro Clancy MD  Date:                                            05/27/2023  Time:                                           22:06    CT HEAD WITHOUT CONTRAST; CT CERVICAL SPINE WITHOUT CONTRAST     CLINICAL HISTORY:  Head trauma, moderate-severe;; Polytrauma, blunt;     TECHNIQUE:  Low dose axial CT images obtained throughout the head and cervical spine without the use of intravenous contrast.  Axial, sagittal and coronal reconstructions were performed.     COMPARISON:  CT head 09/10/2019 and CT cervical spine 09/10/2019.     FINDINGS:  CT head:     Prominence of the ventricles, most pronounced in the temporal horns and disproportionate to the degree of prominence of the sulci.  Overall prominence of the ventricles is increased when compared to CT head from 09/10/2019 and consistent with mild hydrocephalus.     Focal encephalomalacia involving the paramedian aspect of the anterior left frontal lobe and less conspicuously in the anterior right frontal lobe, similar to prior.  No parenchymal mass, hemorrhage, edema or major vascular distribution infarct.     No extra-axial blood or fluid collections.     No fracture. Mastoid air cells and paranasal sinuses are essentially clear.     CT cervical spine:     Alignment: Normal.     Vertebrae: The dens, lateral masses, and occipital condyles are intact.  No fracture.     Discs: Normal height.     Degenerative findings: Small posterior disc osteophyte complex at C3-C4 and C5-C6.  Findings result in mild spinal canal stenosis at C3-C4.  No high-grade neural foraminal narrowing.     Paraspinal muscles & soft tissues: Mild biapical emphysematous changes.  No evidence of a pneumothorax.     Impression:     1. New mild hydrocephalus compared to CT head from 09/10/2019.  Recommend  further evaluation with MRI.  2. No acute fracture or traumatic malalignment of the cervical spine.  3. Stable encephalomalacia in the anterior bilateral frontal lobes, unchanged compared to CT head from 09/10/2019.  4. Degenerative changes of the cervical spine most pronounced at C3-C4 with mild spinal canal stenosis, similar to CT cervical spine from 09/10/2018.  This report was flagged in Epic as abnormal.     Electronically signed by resident: Kiara Nieto  Date:                                            05/27/2023  Time:                                           20:46     Electronically signed by: Robbi Garber MD  Date:                                            05/27/2023  Time:                                           21:14

## 2023-05-28 NOTE — PROGRESS NOTES
[facility] - Neurosurgery ([facility])  Neurosurgery  Progress Note    Subjective:     History of Present Illness: Pt is 42M no significant pmh, donates plasma frequently, presents with 3 days increased confusion, HA, N/V, fevers, chills, body aches and generalized weakness. White count is 26 with elevated procalcitonin. CTH showed increased ventricular size. Pt has positive kernig sign. Concern for possible meningitis.      Post-Op Info:  * No surgery found *         No medications prior to admission.         Review of patient's allergies indicates:   Allergen Reactions    Sulfa (sulfonamide antibiotics) Other (See Comments)     Unknown per pt       History reviewed. No pertinent past medical history.  History reviewed. No pertinent surgical history.  Family History    None       Tobacco Use    Smoking status: Every Day     Packs/day: 0.50     Types: Cigarettes    Smokeless tobacco: Never   Substance and Sexual Activity    Alcohol use: Yes    Drug use: Yes     Types: Marijuana    Sexual activity: Not on file     Review of Systems   Constitutional:  Positive for chills, fatigue and fever. Negative for activity change.   HENT:  Negative for hearing loss and trouble swallowing.    Eyes:  Positive for photophobia. Negative for visual disturbance.   Respiratory:  Negative for shortness of breath.    Cardiovascular:  Negative for chest pain.   Gastrointestinal:  Positive for nausea and vomiting.   Genitourinary:  Negative for difficulty urinating.   Musculoskeletal:  Positive for back pain, neck pain and neck stiffness.   Neurological:  Positive for dizziness, light-headedness and headaches. Negative for tremors and weakness.   Psychiatric/Behavioral:  Positive for confusion.    Objective:     Weight: 70.3 kg (155 lb)  Body mass index is 21.02 kg/m².  Vital Signs (Most Recent):  Temp: 96.1 °F (35.6 °C) (05/28/23 0710)  Pulse: (!) 49 (05/28/23 0710)  Resp: 18 (05/28/23 0710)  BP: 109/70 (05/28/23 0710)  SpO2: 98 %  (05/28/23 0710) Vital Signs (24h Range):  Temp:  [96.1 °F (35.6 °C)-99 °F (37.2 °C)] 96.1 °F (35.6 °C)  Pulse:  [49-76] 49  Resp:  [16-36] 18  SpO2:  [93 %-99 %] 98 %  BP: (101-120)/(59-73) 109/70                                   Physical Exam         Neurosurgery Physical Exam  General: well developed, well nourished, distressed  Head: normocephalic, atraumatic  CV: RRR, pulses equal bilateral  Pulmonary: normal respirations, no signs of respiratory distress  Abdomen: soft, non-distended  Skin: Skin is warm, dry and intact.    Neuro:  E3V5M6  Awake, intermittently drowsy, Ox4  PERRL, EOMI  CNII-XII grossly intact  Motor: Follows commands full strength x4  SILT  Positive kernig and Brudzinski    Significant Labs:  Recent Labs   Lab 05/27/23  1811   *      K 3.8      CO2 24   BUN 17   CREATININE 0.8   CALCIUM 10.1       Recent Labs   Lab 05/27/23  1811   WBC 26.04*   HGB 11.1*   HCT 34.3*          No results for input(s): LABPT, INR, APTT in the last 48 hours.  Microbiology Results (last 7 days)       Procedure Component Value Units Date/Time    Blood culture x two cultures. Draw prior to antibiotics. [[id number]] Collected: 05/27/23 2052    Order Status: Completed Specimen: Blood from Peripheral, Forearm, Left Updated: 05/28/23 0715     Blood Culture, Routine No Growth to date    Narrative:      Aerobic and anaerobic    Blood culture x two cultures. Draw prior to antibiotics. [[id number]] Collected: 05/27/23 1917    Order Status: Completed Specimen: Blood from Peripheral, Antecubital, Left Updated: 05/28/23 0633     Blood Culture, Routine Gram stain aer bottle: Gram positive cocci in chains resembling Strep      Results called to and read back by:Roz Chapman RN 05/28/2023  04:56      Gram stain daria bottle: Gram positive cocci in chains resembling Strep      Positive results previously called 05/28/2023  06:32    Narrative:      Aerobic and anaerobic    Rapid Organism ID by PCR (from Blood  culture) [[id number]]  (Abnormal) Collected: 05/27/23 1917    Order Status: Completed Updated: 05/28/23 0623     Enterococcus faecalis Not Detected     Enterococcus faecium Not Detected     Listeria Monocytogenes Not Detected     Staphylococcus spp. Not Detected     Staphylococcus aureus Not Detected     Staphylococcus epidermidis Not Detected     Staphylococcus lugdunensis Not Detected     Streptococcus species See species for ID     Streptococcus agalactiae Not Detected     Streptococcus pneumoniae Detected     Streptococcus pyogenes Not Detected     Acinetobacter calcoaceticus/baumannii complex Not Detected     Bacteroides fragilis Not Detected     Enterobacerales Not Detected     Enterobacter cloacae complex Not Detected     Escherichia Not Detected     Klebsiella aerogenes Not Detected     Klebsiella oxytoca Not Detected     Klebsiella pneumoniae group Not Detected     Proteus Not Detected     Salmonella sp Not Detected     Serratia marcescens Not Detected     Haemophilus influenzae Not Detected     Neisseria meningtidis Not Detected     Pseudomonas aeruginosa Not Detected     Stenotrophomonas maltophilia Not Detected     Candida albicans Not Detected     Candida auris Not Detected     Candida glabrata Not Detected     Candida krusei Not Detected     Candida parapsilosis Not Detected     Candida tropicalis Not Detected     Cryptococcus neoformans/gattii Not Detected     CTX-M (ESBL ) Not Detected     IMP (Carbapenem resistant) Not Detected     KPC resistance gene (Carbapenem resistant) Not Detected     mcr-1  Not Detected     mec A/C  Not Detected     mec A/C and MREJ (MRSA) gene Not Detected     NDM (Carbapenem resistant) Not Detected     OXA-48-like (Carbapenem resistant) Not Detected     van A/B (VRE gene) Not Detected     VIM (Carbapenem resistant) Not Detected    Narrative:      Aerobic and anaerobic    CSF culture [[id number]] Collected: 05/28/23 0008    Order Status: Completed Specimen: CSF (Spinal  Fluid) Updated: 05/28/23 0309     Gram Stain Result Cytospin indicates:      Many WBC's      Many Gram positive cocci    Narrative:      ADD ON CSF CULTURE PER DR JÚNIOR SILVER/ORDER# [id number] @ 1:22AM    CSF culture and Gram Stain (Tube 2) [[id number]]     Order Status: Completed Specimen: CSF (Spinal Fluid) from CSF Tap, Tube 2     CSF culture and Gram Stain (Tube 2) [[id number]]     Order Status: Canceled Specimen: CSF (Spinal Fluid) from CSF Tap, Tube 2     Influenza A & B by Molecular [[id number]] Collected: 05/27/23 1905    Order Status: Completed Specimen: Nasopharyngeal Swab Updated: 05/27/23 1938     Influenza A, Molecular Negative     Influenza B, Molecular Negative     Flu A & B Source Nasal swab          All pertinent labs from the last 24 hours have been reviewed.    Significant Diagnostics:  I have reviewed all pertinent imaging results/findings within the past 24 hours.  I have reviewed and interpreted all pertinent imaging results/findings within the past 24 hours.    Assessment/Plan:     Abnormal CT of the head  Pt is 42M no significant pmh, donates plasma frequently, presents with 3 days increased confusion, HA, N/V, fevers, chills, body aches and generalized weakness. White count is 26 with elevated procalcitonin. CTH showed increased ventricular size. Pt has positive kernig sign. Concern for possible meningitis.    Plan:  No surgical intervention indicated  HM admission  LP confirms strep pneumo meningitis  Will signoff at this time. Medical management per primary        Tyson Ovalle MD  Neurosurgery  [facility] - Neurosurgery ([facility])

## 2023-05-28 NOTE — HPI
"   41 yo man, s/p a trip to [facility], developed fever, headache, and stiff neck. No know ill contacts. Had URI symptoms prior. Presented to the ED with generalized body aches, chills, nausea, vomiting.  He reports that "everything hurts." Denies any vision changes numbness or weakness in his arms or legs. Underwent CSF analysis c/w pyogenic meningitis. Started on broad abx. ME panel c/w Strep pneumo. Blood and CSF cultures growing the same. ID is consulted for meningitis. Of note, HIV testing is negative.  "

## 2023-05-28 NOTE — CONSULTS
"[facility] - Neurosurgery [facility])  Infectious Disease  Consult Note    Patient Name: Jesus Jean  MRN: [id number]  Admission Date: 5/27/2023  Hospital Length of Stay: 1 days  Attending Physician: Nabil Ardon MD  Primary Care Provider: Primary Doctor No     Isolation Status: No active isolations    Patient information was obtained from patient, past medical records and ER records.      Inpatient consult to Infectious Diseases  Consult performed by: Jarad Zuñiga MD  Consult ordered by: Jorge Guajardo MD        Assessment/Plan:     ID  * Meningitis  41 yo man (HIV negative) admitted with community acquired pneumococcal meningitis:  - Strep pneumo by ME and prelim culture results  - invasive given bacteremia  - CT with possible hydrocephalus? MR reviewed.  - will de-escalate to CTX and vancomycin pending final culture results  - follow repeat blood cultures - if he does not clear, consider JAIRO  - if CTX susceptible, okay to stop vancomycin (and vice versa)  - needs 7-10 days of parenteral abx therpay, depending on his clinical response to therapy  - dc isolation    Thank you for your consult. I will follow-up with patient. Please contact us if you have any additional questions.    Jarad Zuñiga MD  Infectious Disease  [facility] - Neurosurgery [facility])    Subjective:     Principal Problem: Meningitis    HPI:    41 yo man, s/p a trip to [facility], developed fever, headache, and stiff neck. No know ill contacts. Had URI symptoms prior. Presented to the ED with generalized body aches, chills, nausea, vomiting.  He reports that "everything hurts." Denies any vision changes numbness or weakness in his arms or legs. Underwent CSF analysis c/w pyogenic meningitis. Started on broad abx. ME panel c/w Strep pneumo. Blood and CSF cultures growing the same. ID is consulted for meningitis. Of note, HIV testing is negative.      History reviewed. No pertinent past medical history.    History " reviewed. No pertinent surgical history.    Review of patient's allergies indicates:   Allergen Reactions    Sulfa (sulfonamide antibiotics) Other (See Comments)     Unknown per pt       Medications:  No medications prior to admission.     Antibiotics (From admission, onward)      Start     Stop Route Frequency Ordered    05/28/23 1630  vancomycin 1,250 mg in dextrose 5 % (D5W) 250 mL IVPB (Vial-Mate)         -- IV Every 12 hours (non-standard times) 05/28/23 0426    05/28/23 0000  cefTRIAXone (ROCEPHIN) 2 g in dextrose 5 % in water (D5W) 5 % 50 mL IVPB (MB+)         -- IV Every 12 hours (non-standard times) 05/27/23 2348          Antifungals (From admission, onward)      None          Antivirals (From admission, onward)      None               There is no immunization history on file for this patient.    Family History    None       Social History     Socioeconomic History    Marital status: Single   Tobacco Use    Smoking status: Every Day     Packs/day: 0.50     Types: Cigarettes    Smokeless tobacco: Never   Substance and Sexual Activity    Alcohol use: Yes    Drug use: Yes     Types: Marijuana     Review of Systems   Constitutional:  Positive for chills and fever.   Neurological:  Positive for headaches.   Psychiatric/Behavioral:  Negative for confusion.    All other systems reviewed and are negative.  Objective:     Vital Signs (Most Recent):  Temp: 98.4 °F (36.9 °C) (05/28/23 1112)  Pulse: (!) 50 (05/28/23 1112)  Resp: 18 (05/28/23 1112)  BP: (!) 114/57 (05/28/23 1112)  SpO2: 97 % (05/28/23 1112) Vital Signs (24h Range):  Temp:  [96.1 °F (35.6 °C)-99 °F (37.2 °C)] 98.4 °F (36.9 °C)  Pulse:  [42-76] 50  Resp:  [16-36] 18  SpO2:  [93 %-99 %] 97 %  BP: (101-120)/(57-73) 114/57     Weight: 70.3 kg (155 lb)  Body mass index is 21.02 kg/m².    Estimated Creatinine Clearance: 135.3 mL/min (based on SCr of 0.7 mg/dL).     Physical Exam  Vitals and nursing note reviewed.   Constitutional:       General: He is  not in acute distress.     Appearance: Normal appearance. He is ill-appearing. He is not toxic-appearing or diaphoretic.   HENT:      Head: Normocephalic and atraumatic.   Neck:      Comments: Some meningismus  Cardiovascular:      Rate and Rhythm: Bradycardia present.      Heart sounds: No murmur heard.  Abdominal:      Tenderness: There is no abdominal tenderness. There is no guarding.   Musculoskeletal:         General: No swelling or deformity.   Skin:     Findings: No rash.   Neurological:      General: No focal deficit present.      Mental Status: He is alert and oriented to person, place, and time.   Psychiatric:         Mood and Affect: Mood normal.         Behavior: Behavior normal.         Thought Content: Thought content normal.         Judgment: Judgment normal.        Significant Labs: Blood Culture:   Recent Labs   Lab 05/27/23 1917 05/27/23 2052   LABBLOO Gram stain aer bottle: Gram positive cocci in chains resembling Strep  Results called to and read back by:Roz Chapman RN 05/28/2023  04:56  Gram stain daria bottle: Gram positive cocci in chains resembling Strep  Positive results previously called 05/28/2023  06:32 Gram stain aer bottle: Gram positive cocci in chains resembling Strep  Positive results previously called 05/28/2023  08:53  Gram stain daria bottle: Gram positive cocci in chains resembling Strep  Positive results previously called 05/28/2023  11:07     CSF: No results for input(s): CSFCULTURE in the last 4320 hours.  Microbiology Results (last 7 days)       Procedure Component Value Units Date/Time    Blood culture [[id number]]     Order Status: Sent Specimen: Blood     Blood culture [[id number]]     Order Status: Sent Specimen: Blood     Blood culture x two cultures. Draw prior to antibiotics. [[id number]] Collected: 05/27/23 2052    Order Status: Completed Specimen: Blood from Peripheral, Forearm, Left Updated: 05/28/23 1107     Blood Culture, Routine Gram stain aer bottle: Gram  positive cocci in chains resembling Strep      Positive results previously called 05/28/2023  08:53      Gram stain daria bottle: Gram positive cocci in chains resembling Strep      Positive results previously called 05/28/2023  11:07    Narrative:      Aerobic and anaerobic    CSF culture [[id number]] Collected: 05/28/23 0008    Order Status: Completed Specimen: CSF (Spinal Fluid) Updated: 05/28/23 0934     Gram Stain Result Cytospin indicates:      Many WBC's      Many Gram positive cocci      Results called to and read back by:Steven Cole 05/28/2023  09:33    Narrative:      ADD ON CSF CULTURE PER DR JÚNIOR SILVER/ORDER# [id number] @ 1:22AM    Blood culture x two cultures. Draw prior to antibiotics. [[id number]] Collected: 05/27/23 1917    Order Status: Completed Specimen: Blood from Peripheral, Antecubital, Left Updated: 05/28/23 0633     Blood Culture, Routine Gram stain aer bottle: Gram positive cocci in chains resembling Strep      Results called to and read back by:Roz Chapman RN 05/28/2023  04:56      Gram stain daria bottle: Gram positive cocci in chains resembling Strep      Positive results previously called 05/28/2023  06:32    Narrative:      Aerobic and anaerobic    Rapid Organism ID by PCR (from Blood culture) [[id number]]  (Abnormal) Collected: 05/27/23 1917    Order Status: Completed Updated: 05/28/23 0623     Enterococcus faecalis Not Detected     Enterococcus faecium Not Detected     Listeria Monocytogenes Not Detected     Staphylococcus spp. Not Detected     Staphylococcus aureus Not Detected     Staphylococcus epidermidis Not Detected     Staphylococcus lugdunensis Not Detected     Streptococcus species See species for ID     Streptococcus agalactiae Not Detected     Streptococcus pneumoniae Detected     Streptococcus pyogenes Not Detected     Acinetobacter calcoaceticus/baumannii complex Not Detected     Bacteroides fragilis Not Detected     Enterobacerales Not Detected     Enterobacter  cloacae complex Not Detected     Escherichia Not Detected     Klebsiella aerogenes Not Detected     Klebsiella oxytoca Not Detected     Klebsiella pneumoniae group Not Detected     Proteus Not Detected     Salmonella sp Not Detected     Serratia marcescens Not Detected     Haemophilus influenzae Not Detected     Neisseria meningtidis Not Detected     Pseudomonas aeruginosa Not Detected     Stenotrophomonas maltophilia Not Detected     Candida albicans Not Detected     Candida auris Not Detected     Candida glabrata Not Detected     Candida krusei Not Detected     Candida parapsilosis Not Detected     Candida tropicalis Not Detected     Cryptococcus neoformans/gattii Not Detected     CTX-M (ESBL ) Not Detected     IMP (Carbapenem resistant) Not Detected     KPC resistance gene (Carbapenem resistant) Not Detected     mcr-1  Not Detected     mec A/C  Not Detected     mec A/C and MREJ (MRSA) gene Not Detected     NDM (Carbapenem resistant) Not Detected     OXA-48-like (Carbapenem resistant) Not Detected     van A/B (VRE gene) Not Detected     VIM (Carbapenem resistant) Not Detected    Narrative:      Aerobic and anaerobic    CSF culture and Gram Stain (Tube 2) [[id number]]     Order Status: Completed Specimen: CSF (Spinal Fluid) from CSF Tap, Tube 2     CSF culture and Gram Stain (Tube 2) [[id number]]     Order Status: Canceled Specimen: CSF (Spinal Fluid) from CSF Tap, Tube 2     Influenza A & B by Molecular [[id number]] Collected: 05/27/23 1905    Order Status: Completed Specimen: Nasopharyngeal Swab Updated: 05/27/23 1938     Influenza A, Molecular Negative     Influenza B, Molecular Negative     Flu A & B Source Nasal swab          Wound Culture: No results for input(s): LABAERO in the last 4320 hours.    Significant Imaging: I have reviewed all pertinent imaging results/findings within the past 24 hours.

## 2023-05-28 NOTE — ASSESSMENT & PLAN NOTE
Pt is 42M no significant pmh, donates plasma frequently, presents with 3 days increased confusion, HA, N/V, fevers, chills, body aches and generalized weakness. White count is 26 with elevated procalcitonin. CTH showed increased ventricular size. Pt has positive kernig sign. Concern for possible meningitis.    Plan:  No surgical intervention indicated  HM admission  Recommend full workup for meningitis  Recommend LP, blood cultures, then antibiotics and steroids with neurology consultation  Will continue to monitor
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
## 2023-05-28 NOTE — HPI
Pt is 42M no significant pmh, donates plasma frequently, presents with 3 days increased confusion, HA, N/V, fevers, chills, body aches and generalized weakness. White count is 26 with elevated procalcitonin. CTH showed increased ventricular size. Pt has positive kernig sign. Concern for possible meningitis.

## 2023-05-28 NOTE — PROGRESS NOTES
Pharmacokinetic Initial Assessment: IV Vancomycin    Assessment/Plan:    Continue intravenous vancomycin after loading dose of 1500 mg given once before consult followed by a maintenance dose of vancomycin 1250mg IV every 12 hours  Desired empiric serum trough concentration is 15 to 20 mcg/mL  Draw vancomycin trough level 60 min prior to fourth dose on 5/29 at approximately 1530  Pharmacy will continue to follow and monitor vancomycin.      Please contact pharmacy at extension [phone] with any questions regarding this assessment.     Thank you for the consult,   Rubio KHAN Juan Carlos       Patient brief summary:  Jesus Jean is a 42 y.o. male initiated on antimicrobial therapy with IV Vancomycin for treatment of suspected meningitis    Drug Allergies:   Review of patient's allergies indicates:   Allergen Reactions    Sulfa (sulfonamide antibiotics) Other (See Comments)     Unknown per pt       Actual Body Weight:   70.3kg    Renal Function:   Estimated Creatinine Clearance: 118.4 mL/min (based on SCr of 0.8 mg/dL).,     Dialysis Method (if applicable):  N/A    CBC (last 72 hours):  Recent Labs   Lab Result Units 05/27/23  1811   WBC K/uL 26.04*   Hemoglobin g/dL 11.1*   Hematocrit % 34.3*   Platelets K/uL 276   Gran % % 90.8*   Lymph % % 2.6*   Mono % % 5.0   Eosinophil % % 0.0   Basophil % % 0.2   Differential Method  Automated       Metabolic Panel (last 72 hours):  Recent Labs   Lab Result Units 05/27/23  1811 05/27/23  2250 05/28/23  0009   Sodium mmol/L 139  --   --    Potassium mmol/L 3.8  --   --    Chloride mmol/L 103  --   --    CO2 mmol/L 24  --   --    Glucose mg/dL 145*  --   --    Glucose, CSF mg/dL  --   --  <5*   Glucose, UA   --  Negative  --    BUN mg/dL 17  --   --    Creatinine mg/dL 0.8  --   --    Albumin g/dL 2.7*  --   --    Total Bilirubin mg/dL 0.8  --   --    Alkaline Phosphatase U/L 94  --   --    AST U/L 32  --   --    ALT U/L 29  --   --        Drug levels (last 3 results):  No results  for input(s): VANCOMYCINRA, VANCORANDOM, VANCOMYCINPE, VANCOPEAK, VANCOMYCINTR, VANCOTROUGH in the last 72 hours.    Microbiologic Results:  Microbiology Results (last 7 days)       Procedure Component Value Units Date/Time    Blood culture x two cultures. Draw prior to antibiotics. [[id number]] Collected: 05/27/23 1917    Order Status: Completed Specimen: Blood from Peripheral, Antecubital, Left Updated: 05/28/23 0315     Blood Culture, Routine No Growth to date    Narrative:      Aerobic and anaerobic    CSF culture [[id number]] Collected: 05/28/23 0008    Order Status: Completed Specimen: CSF (Spinal Fluid) Updated: 05/28/23 0309     Gram Stain Result Cytospin indicates:      Many WBC's      Many Gram positive cocci    Narrative:      ADD ON CSF CULTURE PER DR JÚNIOR SILVER/ORDER# [id number] @ 1:22AM    CSF culture and Gram Stain (Tube 2) [[id number]]     Order Status: Completed Specimen: CSF (Spinal Fluid) from CSF Tap, Tube 2     CSF culture and Gram Stain (Tube 2) [[id number]]     Order Status: Canceled Specimen: CSF (Spinal Fluid) from CSF Tap, Tube 2     Blood culture x two cultures. Draw prior to antibiotics. [[id number]] Collected: 05/27/23 2052    Order Status: Sent Specimen: Blood from Peripheral, Forearm, Left Updated: 05/27/23 2056    Influenza A & B by Molecular [[id number]] Collected: 05/27/23 1905    Order Status: Completed Specimen: Nasopharyngeal Swab Updated: 05/27/23 1938     Influenza A, Molecular Negative     Influenza B, Molecular Negative     Flu A & B Source Nasal swab

## 2023-05-28 NOTE — ASSESSMENT & PLAN NOTE
Concern for meningitis  -s/p LP in ED  -Start empiric bacterial meningitis coverage - Vancomycin & Ceftriaxone   -Start empiric Dexamethasone for bacterial meningitis - 10mg IV q6hrs.  Discontinue if bacterial meningitis ruled out.   -Empiric IV Acyclovir   -Start Contact Airborne precautions - pending results of CSF studies  -blood cultures obtained.   -Empiric Glucocorticoids   -Add On HSV CSF and Enterocvirus  -ID consult to assist in w/u & management.

## 2023-05-28 NOTE — SUBJECTIVE & OBJECTIVE
History reviewed. No pertinent past medical history.    History reviewed. No pertinent surgical history.    Review of patient's allergies indicates:   Allergen Reactions    Sulfa (sulfonamide antibiotics) Other (See Comments)     Unknown per pt       Medications:  No medications prior to admission.     Antibiotics (From admission, onward)      Start     Stop Route Frequency Ordered    05/28/23 1630  vancomycin 1,250 mg in dextrose 5 % (D5W) 250 mL IVPB (Vial-Mate)         -- IV Every 12 hours (non-standard times) 05/28/23 0426    05/28/23 0000  cefTRIAXone (ROCEPHIN) 2 g in dextrose 5 % in water (D5W) 5 % 50 mL IVPB (MB+)         -- IV Every 12 hours (non-standard times) 05/27/23 2348          Antifungals (From admission, onward)      None          Antivirals (From admission, onward)      None               There is no immunization history on file for this patient.    Family History    None       Social History     Socioeconomic History    Marital status: Single   Tobacco Use    Smoking status: Every Day     Packs/day: 0.50     Types: Cigarettes    Smokeless tobacco: Never   Substance and Sexual Activity    Alcohol use: Yes    Drug use: Yes     Types: Marijuana     Review of Systems   Constitutional:  Positive for chills and fever.   Neurological:  Positive for headaches.   Psychiatric/Behavioral:  Negative for confusion.    All other systems reviewed and are negative.  Objective:     Vital Signs (Most Recent):  Temp: 98.4 °F (36.9 °C) (05/28/23 1112)  Pulse: (!) 50 (05/28/23 1112)  Resp: 18 (05/28/23 1112)  BP: (!) 114/57 (05/28/23 1112)  SpO2: 97 % (05/28/23 1112) Vital Signs (24h Range):  Temp:  [96.1 °F (35.6 °C)-99 °F (37.2 °C)] 98.4 °F (36.9 °C)  Pulse:  [42-76] 50  Resp:  [16-36] 18  SpO2:  [93 %-99 %] 97 %  BP: (101-120)/(57-73) 114/57     Weight: 70.3 kg (155 lb)  Body mass index is 21.02 kg/m².    Estimated Creatinine Clearance: 135.3 mL/min (based on SCr of 0.7 mg/dL).     Physical Exam  Vitals and  nursing note reviewed.   Constitutional:       General: He is not in acute distress.     Appearance: Normal appearance. He is ill-appearing. He is not toxic-appearing or diaphoretic.   HENT:      Head: Normocephalic and atraumatic.   Neck:      Comments: Some meningismus  Cardiovascular:      Rate and Rhythm: Bradycardia present.      Heart sounds: No murmur heard.  Abdominal:      Tenderness: There is no abdominal tenderness. There is no guarding.   Musculoskeletal:         General: No swelling or deformity.   Skin:     Findings: No rash.   Neurological:      General: No focal deficit present.      Mental Status: He is alert and oriented to person, place, and time.   Psychiatric:         Mood and Affect: Mood normal.         Behavior: Behavior normal.         Thought Content: Thought content normal.         Judgment: Judgment normal.        Significant Labs: Blood Culture:   Recent Labs   Lab 05/27/23 1917 05/27/23 2052   LABBLOO Gram stain aer bottle: Gram positive cocci in chains resembling Strep  Results called to and read back by:Roz Chapman RN 05/28/2023  04:56  Gram stain daria bottle: Gram positive cocci in chains resembling Strep  Positive results previously called 05/28/2023  06:32 Gram stain aer bottle: Gram positive cocci in chains resembling Strep  Positive results previously called 05/28/2023  08:53  Gram stain daria bottle: Gram positive cocci in chains resembling Strep  Positive results previously called 05/28/2023  11:07     CSF: No results for input(s): CSFCULTURE in the last 4320 hours.  Microbiology Results (last 7 days)       Procedure Component Value Units Date/Time    Blood culture [[id number]]     Order Status: Sent Specimen: Blood     Blood culture [[id number]]     Order Status: Sent Specimen: Blood     Blood culture x two cultures. Draw prior to antibiotics. [[id number]] Collected: 05/27/23 2052    Order Status: Completed Specimen: Blood from Peripheral, Forearm, Left Updated: 05/28/23  1107     Blood Culture, Routine Gram stain aer bottle: Gram positive cocci in chains resembling Strep      Positive results previously called 05/28/2023  08:53      Gram stain daria bottle: Gram positive cocci in chains resembling Strep      Positive results previously called 05/28/2023  11:07    Narrative:      Aerobic and anaerobic    CSF culture [[id number]] Collected: 05/28/23 0008    Order Status: Completed Specimen: CSF (Spinal Fluid) Updated: 05/28/23 0934     Gram Stain Result Cytospin indicates:      Many WBC's      Many Gram positive cocci      Results called to and read back by:Steven Cole 05/28/2023  09:33    Narrative:      ADD ON CSF CULTURE PER DR JÚNIOR SILVER/ORDER# [id number] @ 1:22AM    Blood culture x two cultures. Draw prior to antibiotics. [[id number]] Collected: 05/27/23 1917    Order Status: Completed Specimen: Blood from Peripheral, Antecubital, Left Updated: 05/28/23 0633     Blood Culture, Routine Gram stain aer bottle: Gram positive cocci in chains resembling Strep      Results called to and read back by:Roz Chapman RN 05/28/2023  04:56      Gram stain daria bottle: Gram positive cocci in chains resembling Strep      Positive results previously called 05/28/2023  06:32    Narrative:      Aerobic and anaerobic    Rapid Organism ID by PCR (from Blood culture) [[id number]]  (Abnormal) Collected: 05/27/23 1917    Order Status: Completed Updated: 05/28/23 0623     Enterococcus faecalis Not Detected     Enterococcus faecium Not Detected     Listeria Monocytogenes Not Detected     Staphylococcus spp. Not Detected     Staphylococcus aureus Not Detected     Staphylococcus epidermidis Not Detected     Staphylococcus lugdunensis Not Detected     Streptococcus species See species for ID     Streptococcus agalactiae Not Detected     Streptococcus pneumoniae Detected     Streptococcus pyogenes Not Detected     Acinetobacter calcoaceticus/baumannii complex Not Detected     Bacteroides fragilis Not  Detected     Enterobacerales Not Detected     Enterobacter cloacae complex Not Detected     Escherichia Not Detected     Klebsiella aerogenes Not Detected     Klebsiella oxytoca Not Detected     Klebsiella pneumoniae group Not Detected     Proteus Not Detected     Salmonella sp Not Detected     Serratia marcescens Not Detected     Haemophilus influenzae Not Detected     Neisseria meningtidis Not Detected     Pseudomonas aeruginosa Not Detected     Stenotrophomonas maltophilia Not Detected     Candida albicans Not Detected     Candida auris Not Detected     Candida glabrata Not Detected     Candida krusei Not Detected     Candida parapsilosis Not Detected     Candida tropicalis Not Detected     Cryptococcus neoformans/gattii Not Detected     CTX-M (ESBL ) Not Detected     IMP (Carbapenem resistant) Not Detected     KPC resistance gene (Carbapenem resistant) Not Detected     mcr-1  Not Detected     mec A/C  Not Detected     mec A/C and MREJ (MRSA) gene Not Detected     NDM (Carbapenem resistant) Not Detected     OXA-48-like (Carbapenem resistant) Not Detected     van A/B (VRE gene) Not Detected     VIM (Carbapenem resistant) Not Detected    Narrative:      Aerobic and anaerobic    CSF culture and Gram Stain (Tube 2) [[id number]]     Order Status: Completed Specimen: CSF (Spinal Fluid) from CSF Tap, Tube 2     CSF culture and Gram Stain (Tube 2) [[id number]]     Order Status: Canceled Specimen: CSF (Spinal Fluid) from CSF Tap, Tube 2     Influenza A & B by Molecular [[id number]] Collected: 05/27/23 1905    Order Status: Completed Specimen: Nasopharyngeal Swab Updated: 05/27/23 1938     Influenza A, Molecular Negative     Influenza B, Molecular Negative     Flu A & B Source Nasal swab          Wound Culture: No results for input(s): LABAERO in the last 4320 hours.    Significant Imaging: I have reviewed all pertinent imaging results/findings within the past 24 hours.

## 2023-05-28 NOTE — SUBJECTIVE & OBJECTIVE
No medications prior to admission.         Review of patient's allergies indicates:   Allergen Reactions    Sulfa (sulfonamide antibiotics) Other (See Comments)     Unknown per pt       History reviewed. No pertinent past medical history.  History reviewed. No pertinent surgical history.  Family History    None       Tobacco Use    Smoking status: Every Day     Packs/day: 0.50     Types: Cigarettes    Smokeless tobacco: Never   Substance and Sexual Activity    Alcohol use: Yes    Drug use: Yes     Types: Marijuana    Sexual activity: Not on file     Review of Systems   Constitutional:  Positive for chills, fatigue and fever. Negative for activity change.   HENT:  Negative for hearing loss and trouble swallowing.    Eyes:  Positive for photophobia. Negative for visual disturbance.   Respiratory:  Negative for shortness of breath.    Cardiovascular:  Negative for chest pain.   Gastrointestinal:  Positive for nausea and vomiting.   Genitourinary:  Negative for difficulty urinating.   Musculoskeletal:  Positive for back pain, neck pain and neck stiffness.   Neurological:  Positive for dizziness, light-headedness and headaches. Negative for tremors and weakness.   Psychiatric/Behavioral:  Positive for confusion.    Objective:     Weight: 70.3 kg (155 lb)  Body mass index is 21.02 kg/m².  Vital Signs (Most Recent):  Temp: 96.1 °F (35.6 °C) (05/28/23 0710)  Pulse: (!) 49 (05/28/23 0710)  Resp: 18 (05/28/23 0710)  BP: 109/70 (05/28/23 0710)  SpO2: 98 % (05/28/23 0710) Vital Signs (24h Range):  Temp:  [96.1 °F (35.6 °C)-99 °F (37.2 °C)] 96.1 °F (35.6 °C)  Pulse:  [49-76] 49  Resp:  [16-36] 18  SpO2:  [93 %-99 %] 98 %  BP: (101-120)/(59-73) 109/70                                   Physical Exam         Neurosurgery Physical Exam  General: well developed, well nourished, distressed  Head: normocephalic, atraumatic  CV: RRR, pulses equal bilateral  Pulmonary: normal respirations, no signs of respiratory distress  Abdomen: soft,  non-distended  Skin: Skin is warm, dry and intact.    Neuro:  E3V5M6  Awake, intermittently drowsy, Ox4  PERRL, EOMI  CNII-XII grossly intact  Motor: Follows commands full strength x4  SILT  Positive kernig and Brudzinski    Significant Labs:  Recent Labs   Lab 05/27/23 1811   *      K 3.8      CO2 24   BUN 17   CREATININE 0.8   CALCIUM 10.1       Recent Labs   Lab 05/27/23 1811   WBC 26.04*   HGB 11.1*   HCT 34.3*          No results for input(s): LABPT, INR, APTT in the last 48 hours.  Microbiology Results (last 7 days)       Procedure Component Value Units Date/Time    Blood culture x two cultures. Draw prior to antibiotics. [[id number]] Collected: 05/27/23 2052    Order Status: Completed Specimen: Blood from Peripheral, Forearm, Left Updated: 05/28/23 0715     Blood Culture, Routine No Growth to date    Narrative:      Aerobic and anaerobic    Blood culture x two cultures. Draw prior to antibiotics. [[id number]] Collected: 05/27/23 1917    Order Status: Completed Specimen: Blood from Peripheral, Antecubital, Left Updated: 05/28/23 0633     Blood Culture, Routine Gram stain aer bottle: Gram positive cocci in chains resembling Strep      Results called to and read back by:Roz Chapman RN 05/28/2023  04:56      Gram stain daria bottle: Gram positive cocci in chains resembling Strep      Positive results previously called 05/28/2023  06:32    Narrative:      Aerobic and anaerobic    Rapid Organism ID by PCR (from Blood culture) [[id number]]  (Abnormal) Collected: 05/27/23 1917    Order Status: Completed Updated: 05/28/23 0623     Enterococcus faecalis Not Detected     Enterococcus faecium Not Detected     Listeria Monocytogenes Not Detected     Staphylococcus spp. Not Detected     Staphylococcus aureus Not Detected     Staphylococcus epidermidis Not Detected     Staphylococcus lugdunensis Not Detected     Streptococcus species See species for ID     Streptococcus agalactiae Not Detected      Streptococcus pneumoniae Detected     Streptococcus pyogenes Not Detected     Acinetobacter calcoaceticus/baumannii complex Not Detected     Bacteroides fragilis Not Detected     Enterobacerales Not Detected     Enterobacter cloacae complex Not Detected     Escherichia Not Detected     Klebsiella aerogenes Not Detected     Klebsiella oxytoca Not Detected     Klebsiella pneumoniae group Not Detected     Proteus Not Detected     Salmonella sp Not Detected     Serratia marcescens Not Detected     Haemophilus influenzae Not Detected     Neisseria meningtidis Not Detected     Pseudomonas aeruginosa Not Detected     Stenotrophomonas maltophilia Not Detected     Candida albicans Not Detected     Candida auris Not Detected     Candida glabrata Not Detected     Candida krusei Not Detected     Candida parapsilosis Not Detected     Candida tropicalis Not Detected     Cryptococcus neoformans/gattii Not Detected     CTX-M (ESBL ) Not Detected     IMP (Carbapenem resistant) Not Detected     KPC resistance gene (Carbapenem resistant) Not Detected     mcr-1  Not Detected     mec A/C  Not Detected     mec A/C and MREJ (MRSA) gene Not Detected     NDM (Carbapenem resistant) Not Detected     OXA-48-like (Carbapenem resistant) Not Detected     van A/B (VRE gene) Not Detected     VIM (Carbapenem resistant) Not Detected    Narrative:      Aerobic and anaerobic    CSF culture [[id number]] Collected: 05/28/23 0008    Order Status: Completed Specimen: CSF (Spinal Fluid) Updated: 05/28/23 0309     Gram Stain Result Cytospin indicates:      Many WBC's      Many Gram positive cocci    Narrative:      ADD ON CSF CULTURE PER DR JÚNIOR SILVER/ORDER# [id number] @ 1:22AM    CSF culture and Gram Stain (Tube 2) [[id number]]     Order Status: Completed Specimen: CSF (Spinal Fluid) from CSF Tap, Tube 2     CSF culture and Gram Stain (Tube 2) [[id number]]     Order Status: Canceled Specimen: CSF (Spinal Fluid) from CSF Tap, Tube 2     Influenza A &  B by Molecular [[id number]] Collected: 05/27/23 1905    Order Status: Completed Specimen: Nasopharyngeal Swab Updated: 05/27/23 1938     Influenza A, Molecular Negative     Influenza B, Molecular Negative     Flu A & B Source Nasal swab          All pertinent labs from the last 24 hours have been reviewed.    Significant Diagnostics:  I have reviewed all pertinent imaging results/findings within the past 24 hours.  I have reviewed and interpreted all pertinent imaging results/findings within the past 24 hours.

## 2023-05-28 NOTE — ED NOTES
Telemetry Verification   Patient placed on Telemetry Box  Verified with War Room  Box # [id number]   Monitor Tech    Rate 57   Rhythm Sinus bradycardia

## 2023-05-28 NOTE — ASSESSMENT & PLAN NOTE
43 yo man (HIV negative) admitted with community acquired pneumococcal meningitis:  - Strep pneumo by ME and prelim culture results  - invasive given bacteremia  - CT with possible hydrocephalus? MR reviewed.  - will de-escalate to CTX and vancomycin pending final culture results  - follow repeat blood cultures - if he does not clear, consider JAIRO  - if CTX susceptible, okay to stop vancomycin (and vice versa)  - needs 7-10 days of parenteral abx therpay, depending on his clinical response to therapy  - dc isolation

## 2023-05-28 NOTE — SUBJECTIVE & OBJECTIVE
(Not in a hospital admission)      Review of patient's allergies indicates:   Allergen Reactions    Sulfa (sulfonamide antibiotics) Other (See Comments)     Unknown per pt       History reviewed. No pertinent past medical history.  History reviewed. No pertinent surgical history.  Family History    None       Tobacco Use    Smoking status: Every Day     Packs/day: 0.50     Types: Cigarettes    Smokeless tobacco: Never   Substance and Sexual Activity    Alcohol use: Yes    Drug use: Yes     Types: Marijuana    Sexual activity: Not on file     Review of Systems   Constitutional:  Positive for chills, fatigue and fever. Negative for activity change.   HENT:  Negative for hearing loss and trouble swallowing.    Eyes:  Positive for photophobia. Negative for visual disturbance.   Respiratory:  Negative for shortness of breath.    Cardiovascular:  Negative for chest pain.   Gastrointestinal:  Positive for nausea and vomiting.   Genitourinary:  Negative for difficulty urinating.   Musculoskeletal:  Positive for back pain, neck pain and neck stiffness.   Neurological:  Positive for dizziness, light-headedness and headaches. Negative for tremors and weakness.   Psychiatric/Behavioral:  Positive for confusion.    Objective:     Weight: 70.3 kg (155 lb)  Body mass index is 21.02 kg/m².  Vital Signs (Most Recent):  Temp: 99 °F (37.2 °C) (05/27/23 2152)  Pulse: 62 (05/27/23 2130)  Resp: 20 (05/27/23 2130)  BP: 108/73 (05/27/23 2130)  SpO2: 96 % (05/27/23 2130) Vital Signs (24h Range):  Temp:  [98.3 °F (36.8 °C)-99 °F (37.2 °C)] 99 °F (37.2 °C)  Pulse:  [62-76] 62  Resp:  [18-20] 20  SpO2:  [96 %-99 %] 96 %  BP: (108-120)/(63-73) 108/73     Date 05/27/23 0700 - 05/28/23 0659   Shift 4100-9042 6768-1130 9558-0764 24 Hour Total   INTAKE   IV Piggyback  2000 2000   Shift Total(mL/kg)  2000(28.4)  2000(28.4)   OUTPUT   Shift Total(mL/kg)       Weight (kg)  70.3 70.3 70.3                               Physical Exam          Neurosurgery Physical Exam  General: well developed, well nourished, distressed  Head: normocephalic, atraumatic  CV: RRR, pulses equal bilateral  Pulmonary: normal respirations, no signs of respiratory distress  Abdomen: soft, non-distended  Skin: Skin is warm, dry and intact.    Neuro:  E3V5M6  Awake, intermittently drowsy, Ox4  PERRL, EOMI  CNII-XII grossly intact  Motor: Follows commands full strength x4  SILT  Positive kernig and Brudzinski    Significant Labs:  Recent Labs   Lab 05/27/23 1811   *      K 3.8      CO2 24   BUN 17   CREATININE 0.8   CALCIUM 10.1     Recent Labs   Lab 05/27/23 1811   WBC 26.04*   HGB 11.1*   HCT 34.3*        No results for input(s): LABPT, INR, APTT in the last 48 hours.  Microbiology Results (last 7 days)       Procedure Component Value Units Date/Time    Blood culture x two cultures. Draw prior to antibiotics. [[id number]] Collected: 05/27/23 2052    Order Status: Sent Specimen: Blood from Peripheral, Forearm, Left Updated: 05/27/23 2056    Blood culture x two cultures. Draw prior to antibiotics. [[id number]] Collected: 05/27/23 1917    Order Status: Sent Specimen: Blood from Peripheral, Antecubital, Left Updated: 05/27/23 1951    Influenza A & B by Molecular [[id number]] Collected: 05/27/23 1905    Order Status: Completed Specimen: Nasopharyngeal Swab Updated: 05/27/23 1938     Influenza A, Molecular Negative     Influenza B, Molecular Negative     Flu A & B Source Nasal swab          All pertinent labs from the last 24 hours have been reviewed.    Significant Diagnostics:  I have reviewed all pertinent imaging results/findings within the past 24 hours.  I have reviewed and interpreted all pertinent imaging results/findings within the past 24 hours.

## 2023-05-28 NOTE — ASSESSMENT & PLAN NOTE
Pt is 42M no significant pmh, donates plasma frequently, presents with 3 days increased confusion, HA, N/V, fevers, chills, body aches and generalized weakness. White count is 26 with elevated procalcitonin. CTH showed increased ventricular size. Pt has positive kernig sign. Concern for possible meningitis.    Plan:  No surgical intervention indicated  HM admission  LP confirms strep pneumo meningitis  Will signoff at this time. Medical management per primary

## 2023-05-29 PROBLEM — D50.9 MICROCYTIC ANEMIA: Status: ACTIVE | Noted: 2023-05-29

## 2023-05-29 PROBLEM — R31.29 MICROSCOPIC HEMATURIA: Status: ACTIVE | Noted: 2023-05-29

## 2023-05-29 PROBLEM — R79.89 ELEVATED LFTS: Status: ACTIVE | Noted: 2023-05-29
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
## 2023-05-29 PROBLEM — R78.81 BACTEREMIA: Status: ACTIVE | Noted: 2023-05-29

## 2023-05-29 LAB
ALBUMIN SERPL BCP-MCNC: 2.1 G/DL (ref 3.5–5.2)
ALP SERPL-CCNC: 200 U/L (ref 55–135)
ALT SERPL W/O P-5'-P-CCNC: 219 U/L (ref 10–44)
ANION GAP SERPL CALC-SCNC: 11 MMOL/L (ref 8–16)
ANISOCYTOSIS BLD QL SMEAR: SLIGHT
AST SERPL-CCNC: 277 U/L (ref 10–40)
BASOPHILS NFR BLD: 0 % (ref 0–1.9)
BILIRUB SERPL-MCNC: 0.4 MG/DL (ref 0.1–1)
BUN SERPL-MCNC: 19 MG/DL (ref 6–20)
BURR CELLS BLD QL SMEAR: ABNORMAL
CALCIUM SERPL-MCNC: 9.3 MG/DL (ref 8.7–10.5)
CHLORIDE SERPL-SCNC: 104 MMOL/L (ref 95–110)
CK SERPL-CCNC: 56 U/L (ref 20–200)
CO2 SERPL-SCNC: 26 MMOL/L (ref 23–29)
CREAT SERPL-MCNC: 0.8 MG/DL (ref 0.5–1.4)
DIFFERENTIAL METHOD: ABNORMAL
EOSINOPHIL NFR BLD: 0 % (ref 0–8)
ERYTHROCYTE [DISTWIDTH] IN BLOOD BY AUTOMATED COUNT: 14 % (ref 11.5–14.5)
EST. GFR  (NO RACE VARIABLE): >60 ML/MIN/1.73 M^2
FERRITIN SERPL-MCNC: 662 NG/ML (ref 20–300)
GLUCOSE SERPL-MCNC: 167 MG/DL (ref 70–110)
HAV IGM SERPL QL IA: NORMAL
HBV CORE IGM SERPL QL IA: NORMAL
HBV SURFACE AG SERPL QL IA: NORMAL
HCT VFR BLD AUTO: 30.2 % (ref 40–54)
HCV AB SERPL QL IA: NORMAL
HGB BLD-MCNC: 10.1 G/DL (ref 14–18)
HYPOCHROMIA BLD QL SMEAR: ABNORMAL
IMM GRANULOCYTES # BLD AUTO: ABNORMAL K/UL (ref 0–0.04)
IMM GRANULOCYTES NFR BLD AUTO: ABNORMAL % (ref 0–0.5)
IRON SERPL-MCNC: 21 UG/DL (ref 45–160)
LYMPHOCYTES NFR BLD: 3 % (ref 18–48)
MAGNESIUM SERPL-MCNC: 2.3 MG/DL (ref 1.6–2.6)
MCH RBC QN AUTO: 23.6 PG (ref 27–31)
MCHC RBC AUTO-ENTMCNC: 33.4 G/DL (ref 32–36)
MCV RBC AUTO: 71 FL (ref 82–98)
METAMYELOCYTES NFR BLD MANUAL: 1 %
MONOCYTES NFR BLD: 2 % (ref 4–15)
NEUTROPHILS NFR BLD: 86 % (ref 38–73)
NEUTS BAND NFR BLD MANUAL: 8 %
NRBC BLD-RTO: 0 /100 WBC
OVALOCYTES BLD QL SMEAR: ABNORMAL
PHOSPHATE SERPL-MCNC: 3.4 MG/DL (ref 2.7–4.5)
PLATELET # BLD AUTO: 333 K/UL (ref 150–450)
PLATELET BLD QL SMEAR: ABNORMAL
PMV BLD AUTO: 11.9 FL (ref 9.2–12.9)
POIKILOCYTOSIS BLD QL SMEAR: SLIGHT
POLYCHROMASIA BLD QL SMEAR: ABNORMAL
POTASSIUM SERPL-SCNC: 4.3 MMOL/L (ref 3.5–5.1)
PROT SERPL-MCNC: 6.4 G/DL (ref 6–8.4)
RBC # BLD AUTO: 4.28 M/UL (ref 4.6–6.2)
SATURATED IRON: 11 % (ref 20–50)
SCHISTOCYTES BLD QL SMEAR: ABNORMAL
SCHISTOCYTES BLD QL SMEAR: PRESENT
SODIUM SERPL-SCNC: 141 MMOL/L (ref 136–145)
SPHEROCYTES BLD QL SMEAR: ABNORMAL
TARGETS BLD QL SMEAR: ABNORMAL
TOTAL IRON BINDING CAPACITY: 198 UG/DL (ref 250–450)
TRANSFERRIN SERPL-MCNC: 134 MG/DL (ref 200–375)
VANCOMYCIN TROUGH SERPL-MCNC: 6.7 UG/ML (ref 10–22)
WBC # BLD AUTO: 26.39 K/UL (ref 3.9–12.7)
WBC TOXIC VACUOLES BLD QL SMEAR: PRESENT

## 2023-05-29 PROCEDURE — 99233 SBSQ HOSP IP/OBS HIGH 50: CPT | Mod: ,,, | Performed by: STUDENT IN AN ORGANIZED HEALTH CARE EDUCATION/TRAINING PROGRAM

## 2023-05-29 PROCEDURE — 99233 PR SUBSEQUENT HOSPITAL CARE,LEVL III: ICD-10-PCS | Mod: ,,, | Performed by: STUDENT IN AN ORGANIZED HEALTH CARE EDUCATION/TRAINING PROGRAM

## 2023-05-29 PROCEDURE — 99232 PR SUBSEQUENT HOSPITAL CARE,LEVL II: ICD-10-PCS | Mod: ,,, | Performed by: STUDENT IN AN ORGANIZED HEALTH CARE EDUCATION/TRAINING PROGRAM

## 2023-05-29 PROCEDURE — 80074 ACUTE HEPATITIS PANEL: CPT | Performed by: STUDENT IN AN ORGANIZED HEALTH CARE EDUCATION/TRAINING PROGRAM

## 2023-05-29 PROCEDURE — 63600175 PHARM REV CODE 636 W HCPCS: Performed by: STUDENT IN AN ORGANIZED HEALTH CARE EDUCATION/TRAINING PROGRAM

## 2023-05-29 PROCEDURE — 84466 ASSAY OF TRANSFERRIN: CPT | Performed by: STUDENT IN AN ORGANIZED HEALTH CARE EDUCATION/TRAINING PROGRAM

## 2023-05-29 PROCEDURE — 84100 ASSAY OF PHOSPHORUS: CPT | Performed by: STUDENT IN AN ORGANIZED HEALTH CARE EDUCATION/TRAINING PROGRAM

## 2023-05-29 PROCEDURE — 99232 SBSQ HOSP IP/OBS MODERATE 35: CPT | Mod: ,,, | Performed by: STUDENT IN AN ORGANIZED HEALTH CARE EDUCATION/TRAINING PROGRAM

## 2023-05-29 PROCEDURE — 80053 COMPREHEN METABOLIC PANEL: CPT | Performed by: STUDENT IN AN ORGANIZED HEALTH CARE EDUCATION/TRAINING PROGRAM

## 2023-05-29 PROCEDURE — 63600175 PHARM REV CODE 636 W HCPCS: Performed by: HOSPITALIST

## 2023-05-29 PROCEDURE — 82728 ASSAY OF FERRITIN: CPT | Performed by: STUDENT IN AN ORGANIZED HEALTH CARE EDUCATION/TRAINING PROGRAM

## 2023-05-29 PROCEDURE — 85025 COMPLETE CBC W/AUTO DIFF WBC: CPT | Performed by: STUDENT IN AN ORGANIZED HEALTH CARE EDUCATION/TRAINING PROGRAM

## 2023-05-29 PROCEDURE — 25000003 PHARM REV CODE 250: Performed by: HOSPITALIST

## 2023-05-29 PROCEDURE — 82550 ASSAY OF CK (CPK): CPT | Performed by: STUDENT IN AN ORGANIZED HEALTH CARE EDUCATION/TRAINING PROGRAM

## 2023-05-29 PROCEDURE — 83735 ASSAY OF MAGNESIUM: CPT | Performed by: STUDENT IN AN ORGANIZED HEALTH CARE EDUCATION/TRAINING PROGRAM

## 2023-05-29 PROCEDURE — 11000001 HC ACUTE MED/SURG PRIVATE ROOM

## 2023-05-29 PROCEDURE — 80202 ASSAY OF VANCOMYCIN: CPT | Performed by: STUDENT IN AN ORGANIZED HEALTH CARE EDUCATION/TRAINING PROGRAM

## 2023-05-29 PROCEDURE — 36415 COLL VENOUS BLD VENIPUNCTURE: CPT | Performed by: STUDENT IN AN ORGANIZED HEALTH CARE EDUCATION/TRAINING PROGRAM

## 2023-05-29 RX ORDER — SODIUM CHLORIDE, SODIUM LACTATE, POTASSIUM CHLORIDE, CALCIUM CHLORIDE 600; 310; 30; 20 MG/100ML; MG/100ML; MG/100ML; MG/100ML
INJECTION, SOLUTION INTRAVENOUS CONTINUOUS
Status: ACTIVE | OUTPATIENT
Start: 2023-05-29 | End: 2023-05-29

## 2023-05-29 RX ADMIN — CEFTRIAXONE 2 G: 2 INJECTION, POWDER, FOR SOLUTION INTRAMUSCULAR; INTRAVENOUS at 12:05

## 2023-05-29 RX ADMIN — ACETAMINOPHEN 650 MG: 325 TABLET ORAL at 05:05

## 2023-05-29 RX ADMIN — DEXAMETHASONE SODIUM PHOSPHATE 10 MG: 4 INJECTION INTRA-ARTICULAR; INTRALESIONAL; INTRAMUSCULAR; INTRAVENOUS; SOFT TISSUE at 05:05

## 2023-05-29 RX ADMIN — DEXAMETHASONE SODIUM PHOSPHATE 10 MG: 4 INJECTION INTRA-ARTICULAR; INTRALESIONAL; INTRAMUSCULAR; INTRAVENOUS; SOFT TISSUE at 12:05

## 2023-05-29 RX ADMIN — VANCOMYCIN HYDROCHLORIDE 1250 MG: 1.25 INJECTION, POWDER, LYOPHILIZED, FOR SOLUTION INTRAVENOUS at 04:05

## 2023-05-29 RX ADMIN — VANCOMYCIN HYDROCHLORIDE 1250 MG: 1.25 INJECTION, POWDER, LYOPHILIZED, FOR SOLUTION INTRAVENOUS at 03:05

## 2023-05-29 RX ADMIN — ACETAMINOPHEN 650 MG: 325 TABLET ORAL at 03:05

## 2023-05-29 RX ADMIN — SODIUM CHLORIDE, SODIUM LACTATE, POTASSIUM CHLORIDE, AND CALCIUM CHLORIDE: 600; 310; 30; 20 INJECTION, SOLUTION INTRAVENOUS at 12:05

## 2023-05-29 RX ADMIN — DEXAMETHASONE SODIUM PHOSPHATE 10 MG: 4 INJECTION INTRA-ARTICULAR; INTRALESIONAL; INTRAMUSCULAR; INTRAVENOUS; SOFT TISSUE at 06:05

## 2023-05-29 RX ADMIN — DEXAMETHASONE SODIUM PHOSPHATE 10 MG: 4 INJECTION INTRA-ARTICULAR; INTRALESIONAL; INTRAMUSCULAR; INTRAVENOUS; SOFT TISSUE at 11:05

## 2023-05-29 RX ADMIN — ACETAMINOPHEN 650 MG: 325 TABLET ORAL at 12:05

## 2023-05-29 NOTE — ASSESSMENT & PLAN NOTE
Concern for meningitis after presenting with 3 days increased confusion, HA, N/V, fevers, chills, body aches and generalized weakness     - Admit CTH showed increased ventricular size  - MRI Brain stable with no acute infarct  - s/p LP in ED  - Infectious disease consulted  -- LP studies positive for Strep pneump  -- Continue CTX and Vancomycin   -- Dexamethasone x4 days, sot 5/28  -- Blood culture with Strep penumo   -- Repeat until clearance   -- TTE ordered  - PT/OT consult

## 2023-05-29 NOTE — PROGRESS NOTES
Pharmacokinetic Assessment Follow Up: IV Vancomycin    Vancomycin serum concentration assessment(s):    The trough level was drawn correctly and can be used to guide therapy at this time. The measurement is below the desired definitive target range of 15 to 20 mcg/mL.    Vancomycin Regimen Plan:    Change regimen to Vancomycin 1500 mg IV every 12 hours with next serum trough concentration measured at 1530 prior to 4th dose on 05/31    Drug levels (last 3 results):  Recent Labs   Lab Result Units 05/29/23  1537   Vancomycin-Trough ug/mL 6.7*       Pharmacy will continue to follow and monitor vancomycin.    Please contact pharmacy at extension [phone] for questions regarding this assessment.    Thank you for the consult,   Karolina Le       Patient brief summary:  Jesus Jean is a 42 y.o. male initiated on antimicrobial therapy with IV Vancomycin for treatment of meningitis      Drug Allergies:   Review of patient's allergies indicates:   Allergen Reactions    Sulfa (sulfonamide antibiotics) Other (See Comments)     Unknown per pt       Actual Body Weight:   70.3 kg    Renal Function:   Estimated Creatinine Clearance: 118.4 mL/min (based on SCr of 0.8 mg/dL).,     Dialysis Method (if applicable):  N/A    CBC (last 72 hours):  Recent Labs   Lab Result Units 05/27/23 1811 05/28/23  0817 05/29/23  0529   WBC K/uL 26.04* 29.85* 26.39*   Hemoglobin g/dL 11.1* 10.2* 10.1*   Hematocrit % 34.3* 31.1* 30.2*   Platelets K/uL 276 259 333   Gran % % 90.8* 92.3* 86.0*   Lymph % % 2.6* 2.5* 3.0*   Mono % % 5.0 4.0 2.0*   Eosinophil % % 0.0 0.1 0.0   Basophil % % 0.2 0.2 0.0   Differential Method  Automated Automated Automated       Metabolic Panel (last 72 hours):  Recent Labs   Lab Result Units 05/27/23  1811 05/27/23  2250 05/28/23  0009 05/28/23  0817 05/29/23  0529   Sodium mmol/L 139  --   --  140 141   Potassium mmol/L 3.8  --   --  4.2 4.3   Chloride mmol/L 103  --   --  104 104   CO2 mmol/L 24  --   --  27 26    Glucose mg/dL 145*  --   --  159* 167*   Glucose, CSF mg/dL  --   --  <5*  --   --    Glucose, UA   --  Negative  --   --   --    BUN mg/dL 17  --   --  14 19   Creatinine mg/dL 0.8  --   --  0.7 0.8   Albumin g/dL 2.7*  --   --  2.4* 2.1*   Total Bilirubin mg/dL 0.8  --   --  0.8 0.4   Alkaline Phosphatase U/L 94  --   --  111 200*   AST U/L 32  --   --  27 277*   ALT U/L 29  --   --  36 219*   Magnesium mg/dL  --   --   --  2.2 2.3   Phosphorus mg/dL  --   --   --  2.7 3.4       Vancomycin Administrations:  vancomycin given in the last 96 hours                     vancomycin 1,250 mg in dextrose 5 % (D5W) 250 mL IVPB (Vial-Mate) (mg) 1,250 mg New Bag 05/29/23 1645     1,250 mg New Bag  0337     1,250 mg New Bag 05/28/23 1557    vancomycin 1,500 mg in dextrose 5 % (D5W) 250 mL IVPB (Vial-Mate) (mg) 1,500 mg New Bag 05/28/23 0417                    Microbiologic Results:  Microbiology Results (last 7 days)       Procedure Component Value Units Date/Time    Blood culture x two cultures. Draw prior to antibiotics. [[id number]]  (Abnormal) Collected: 05/27/23 2052    Order Status: Completed Specimen: Blood from Peripheral, Forearm, Left Updated: 05/29/23 1619     Blood Culture, Routine Gram stain aer bottle: Gram positive cocci in chains resembling Strep      Positive results previously called 05/28/2023  08:53      Gram stain daria bottle: Gram positive cocci in chains resembling Strep      Positive results previously called 05/28/2023  11:07      STREPTOCOCCUS PNEUMONIAE  For susceptibility see order #[id number]      Narrative:      Aerobic and anaerobic    Blood culture x two cultures. Draw prior to antibiotics. [[id number]]  (Abnormal) Collected: 05/27/23 1917    Order Status: Completed Specimen: Blood from Peripheral, Antecubital, Left Updated: 05/29/23 1530     Blood Culture, Routine Gram stain aer bottle: Gram positive cocci in chains resembling Strep      Results called to and read back by:Roz Chapman RN  05/28/2023  04:56      Gram stain daria bottle: Gram positive cocci in chains resembling Strep      Positive results previously called 05/28/2023  06:32      STREPTOCOCCUS PNEUMONIAE  Susceptibility pending      Narrative:      Aerobic and anaerobic    CSF culture [[id number]]  (Abnormal) Collected: 05/28/23 0008    Order Status: Completed Specimen: CSF (Spinal Fluid) Updated: 05/29/23 1525     CSF CULTURE STREPTOCOCCUS PNEUMONIAE  Susceptibility pending       Gram Stain Result Cytospin indicates:      Many WBC's      Many Gram positive cocci      Results called to and read back by:Steven Cole 05/28/2023  09:33    Narrative:      ADD ON CSF CULTURE PER DR JÚNIOR SILVER/ORDER# [id number] @ 1:22AM    Blood culture [[id number]] Collected: 05/28/23 1326    Order Status: Completed Specimen: Blood Updated: 05/29/23 0115     Blood Culture, Routine No Growth to date    Blood culture [[id number]] Collected: 05/28/23 1326    Order Status: Completed Specimen: Blood Updated: 05/29/23 0115     Blood Culture, Routine No Growth to date    Rapid Organism ID by PCR (from Blood culture) [[id number]]  (Abnormal) Collected: 05/27/23 1917    Order Status: Completed Updated: 05/28/23 0623     Enterococcus faecalis Not Detected     Enterococcus faecium Not Detected     Listeria Monocytogenes Not Detected     Staphylococcus spp. Not Detected     Staphylococcus aureus Not Detected     Staphylococcus epidermidis Not Detected     Staphylococcus lugdunensis Not Detected     Streptococcus species See species for ID     Streptococcus agalactiae Not Detected     Streptococcus pneumoniae Detected     Streptococcus pyogenes Not Detected     Acinetobacter calcoaceticus/baumannii complex Not Detected     Bacteroides fragilis Not Detected     Enterobacerales Not Detected     Enterobacter cloacae complex Not Detected     Escherichia Not Detected     Klebsiella aerogenes Not Detected     Klebsiella oxytoca Not Detected     Klebsiella pneumoniae group  Not Detected     Proteus Not Detected     Salmonella sp Not Detected     Serratia marcescens Not Detected     Haemophilus influenzae Not Detected     Neisseria meningtidis Not Detected     Pseudomonas aeruginosa Not Detected     Stenotrophomonas maltophilia Not Detected     Candida albicans Not Detected     Candida auris Not Detected     Candida glabrata Not Detected     Candida krusei Not Detected     Candida parapsilosis Not Detected     Candida tropicalis Not Detected     Cryptococcus neoformans/gattii Not Detected     CTX-M (ESBL ) Not Detected     IMP (Carbapenem resistant) Not Detected     KPC resistance gene (Carbapenem resistant) Not Detected     mcr-1  Not Detected     mec A/C  Not Detected     mec A/C and MREJ (MRSA) gene Not Detected     NDM (Carbapenem resistant) Not Detected     OXA-48-like (Carbapenem resistant) Not Detected     van A/B (VRE gene) Not Detected     VIM (Carbapenem resistant) Not Detected    Narrative:      Aerobic and anaerobic    CSF culture and Gram Stain (Tube 2) [[id number]]     Order Status: Completed Specimen: CSF (Spinal Fluid) from CSF Tap, Tube 2     CSF culture and Gram Stain (Tube 2) [[id number]]     Order Status: Canceled Specimen: CSF (Spinal Fluid) from CSF Tap, Tube 2     Influenza A & B by Molecular [[id number]] Collected: 05/27/23 1905    Order Status: Completed Specimen: Nasopharyngeal Swab Updated: 05/27/23 1938     Influenza A, Molecular Negative     Influenza B, Molecular Negative     Flu A & B Source Nasal swab           Yes

## 2023-05-29 NOTE — ASSESSMENT & PLAN NOTE
- Noted on 5/29 during treatment for Stept pneumo meningitis   - Negative HepC Ab  - Acute hepatitis panel ordered  - May need liver US  - Trend CMP

## 2023-05-29 NOTE — ASSESSMENT & PLAN NOTE
43 yo male with tobacco use admitted with CA-pneumococcal meningitis/bacteremia. CT C/A/P without consolidation or acute infectious foci. Repeat blcx ngtd. CSF culture with [facility] - ME panel with strep pneumo. Labs reviewed - leukocytosis improving, WBC 26 today.     Recommendations:  -continue vancomycin pharm to dose and ceftriaxone pending cx data/susceptibilities   -monitor for nephrotoxicity/hepatotoxicity with CMP   -mild LFT bump noted on lab work today, hep studies negative  -continue dexamethasone x 4d   -echo for completeness  -follow up cx data

## 2023-05-29 NOTE — PLAN OF CARE
Problem: Adult Inpatient Plan of Care  Goal: Plan of Care Review  Outcome: Ongoing, Progressing  Goal: Absence of Hospital-Acquired Illness or Injury  Outcome: Ongoing, Progressing  Intervention: Identify and Manage Fall Risk  Flowsheets (Taken 5/29/2023 0407)  Safety Promotion/Fall Prevention:   Fall Risk reviewed with patient/family   bed alarm set   medications reviewed   nonskid shoes/socks when out of bed   instructed to call staff for mobility     Problem: Infection  Goal: Absence of Infection Signs and Symptoms  Outcome: Ongoing, Progressing  Intervention: Prevent or Manage Infection  Flowsheets (Taken 5/29/2023 0407)  Fever Reduction/Comfort Measures: lightweight bedding  Infection Management: aseptic technique maintained  Isolation Precautions:   protective   precautions maintained
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
## 2023-05-29 NOTE — SUBJECTIVE & OBJECTIVE
Interval History: No fevers documented overnight. Pt reports tolerating abx without issues. Pt stated he did not get his pneumococcal vaccine.       Review of Systems   Constitutional:  Negative for chills and fever.   Objective:     Vital Signs (Most Recent):  Temp: 98.5 °F (36.9 °C) (05/29/23 0659)  Pulse: (!) 42 (05/29/23 0659)  Resp: 16 (05/29/23 0659)  BP: 101/70 (05/29/23 0659)  SpO2: 97 % (05/29/23 0659) Vital Signs (24h Range):  Temp:  [96.8 °F (36 °C)-100.1 °F (37.8 °C)] 98.5 °F (36.9 °C)  Pulse:  [42-69] 42  Resp:  [16-20] 16  SpO2:  [95 %-98 %] 97 %  BP: ()/(57-76) 101/70     Weight: 70.3 kg (155 lb)  Body mass index is 21.02 kg/m².    Estimated Creatinine Clearance: 118.4 mL/min (based on SCr of 0.8 mg/dL).     Physical Exam  Constitutional:       General: He is not in acute distress.     Appearance: He is not toxic-appearing.   HENT:      Head: Normocephalic and atraumatic.      Right Ear: External ear normal.      Left Ear: External ear normal.      Mouth/Throat:      Mouth: Mucous membranes are moist.   Eyes:      General: No scleral icterus.        Right eye: No discharge.         Left eye: No discharge.   Pulmonary:      Effort: Pulmonary effort is normal. No respiratory distress.   Abdominal:      General: There is no distension.      Palpations: Abdomen is soft.      Tenderness: There is no abdominal tenderness. There is no guarding.   Musculoskeletal:      Cervical back: No rigidity or tenderness.      Right lower leg: No edema.      Left lower leg: No edema.   Skin:     General: Skin is warm and dry.   Neurological:      Mental Status: He is alert.        Significant Labs:   Microbiology Results (last 7 days)       Procedure Component Value Units Date/Time    Blood culture [[id number]] Collected: 05/28/23 1326    Order Status: Completed Specimen: Blood Updated: 05/29/23 0115     Blood Culture, Routine No Growth to date    Blood culture [[id number]] Collected: 05/28/23 1326    Order Status:  Completed Specimen: Blood Updated: 05/29/23 0115     Blood Culture, Routine No Growth to date    Blood culture x two cultures. Draw prior to antibiotics. [[id number]] Collected: 05/27/23 2052    Order Status: Completed Specimen: Blood from Peripheral, Forearm, Left Updated: 05/28/23 1107     Blood Culture, Routine Gram stain aer bottle: Gram positive cocci in chains resembling Strep      Positive results previously called 05/28/2023  08:53      Gram stain daria bottle: Gram positive cocci in chains resembling Strep      Positive results previously called 05/28/2023  11:07    Narrative:      Aerobic and anaerobic    CSF culture [[id number]] Collected: 05/28/23 0008    Order Status: Completed Specimen: CSF (Spinal Fluid) Updated: 05/28/23 0934     Gram Stain Result Cytospin indicates:      Many WBC's      Many Gram positive cocci      Results called to and read back by:Steven Cole 05/28/2023  09:33    Narrative:      ADD ON CSF CULTURE PER DR JÚNIOR SILVER/ORDER# [id number] @ 1:22AM    Blood culture x two cultures. Draw prior to antibiotics. [[id number]] Collected: 05/27/23 1917    Order Status: Completed Specimen: Blood from Peripheral, Antecubital, Left Updated: 05/28/23 0633     Blood Culture, Routine Gram stain aer bottle: Gram positive cocci in chains resembling Strep      Results called to and read back by:Roz Chapman RN 05/28/2023  04:56      Gram stain daria bottle: Gram positive cocci in chains resembling Strep      Positive results previously called 05/28/2023  06:32    Narrative:      Aerobic and anaerobic    Rapid Organism ID by PCR (from Blood culture) [[id number]]  (Abnormal) Collected: 05/27/23 1917    Order Status: Completed Updated: 05/28/23 0623     Enterococcus faecalis Not Detected     Enterococcus faecium Not Detected     Listeria Monocytogenes Not Detected     Staphylococcus spp. Not Detected     Staphylococcus aureus Not Detected     Staphylococcus epidermidis Not Detected     Staphylococcus  lugdunensis Not Detected     Streptococcus species See species for ID     Streptococcus agalactiae Not Detected     Streptococcus pneumoniae Detected     Streptococcus pyogenes Not Detected     Acinetobacter calcoaceticus/baumannii complex Not Detected     Bacteroides fragilis Not Detected     Enterobacerales Not Detected     Enterobacter cloacae complex Not Detected     Escherichia Not Detected     Klebsiella aerogenes Not Detected     Klebsiella oxytoca Not Detected     Klebsiella pneumoniae group Not Detected     Proteus Not Detected     Salmonella sp Not Detected     Serratia marcescens Not Detected     Haemophilus influenzae Not Detected     Neisseria meningtidis Not Detected     Pseudomonas aeruginosa Not Detected     Stenotrophomonas maltophilia Not Detected     Candida albicans Not Detected     Candida auris Not Detected     Candida glabrata Not Detected     Candida krusei Not Detected     Candida parapsilosis Not Detected     Candida tropicalis Not Detected     Cryptococcus neoformans/gattii Not Detected     CTX-M (ESBL ) Not Detected     IMP (Carbapenem resistant) Not Detected     KPC resistance gene (Carbapenem resistant) Not Detected     mcr-1  Not Detected     mec A/C  Not Detected     mec A/C and MREJ (MRSA) gene Not Detected     NDM (Carbapenem resistant) Not Detected     OXA-48-like (Carbapenem resistant) Not Detected     van A/B (VRE gene) Not Detected     VIM (Carbapenem resistant) Not Detected    Narrative:      Aerobic and anaerobic    CSF culture and Gram Stain (Tube 2) [[id number]]     Order Status: Completed Specimen: CSF (Spinal Fluid) from CSF Tap, Tube 2     CSF culture and Gram Stain (Tube 2) [[id number]]     Order Status: Canceled Specimen: CSF (Spinal Fluid) from CSF Tap, Tube 2     Influenza A & B by Molecular [[id number]] Collected: 05/27/23 1905    Order Status: Completed Specimen: Nasopharyngeal Swab Updated: 05/27/23 1938     Influenza A, Molecular Negative     Influenza B,  Molecular Negative     Flu A & B Source Nasal swab            Significant Imaging: I have reviewed all pertinent imaging results/findings within the past 24 hours.

## 2023-05-29 NOTE — SUBJECTIVE & OBJECTIVE
Interval History:   No events overnight. Patient reports improvement of headache. Endorse continue weakness of BLE and BUE. Denies fevers, chills, numbness, tingling, and vision changes. Repeat BCx NGTD. LFTs elevated today.       Review of Systems   Constitutional:  Positive for appetite change and fatigue. Negative for activity change and chills.   HENT:  Negative for sore throat and trouble swallowing.    Eyes:  Negative for photophobia and discharge.   Respiratory:  Negative for wheezing.    Cardiovascular:  Negative for chest pain and leg swelling.   Gastrointestinal:  Positive for constipation and nausea. Negative for abdominal distention, abdominal pain and diarrhea.   Endocrine: Negative for cold intolerance and heat intolerance.   Genitourinary:  Negative for dysuria.   Musculoskeletal:  Negative for arthralgias, back pain and myalgias.   Skin:  Negative for rash.   Neurological:  Positive for weakness and headaches.   Psychiatric/Behavioral:  Negative for agitation, behavioral problems and confusion.    Objective:     Vital Signs (Most Recent):  Temp: 98 °F (36.7 °C) (05/29/23 1152)  Pulse: (!) 54 (05/29/23 1152)  Resp: 20 (05/29/23 1152)  BP: 106/68 (05/29/23 1152)  SpO2: 97 % (05/29/23 1152) Vital Signs (24h Range):  Temp:  [96.8 °F (36 °C)-100.1 °F (37.8 °C)] 98 °F (36.7 °C)  Pulse:  [42-69] 54  Resp:  [16-20] 20  SpO2:  [95 %-98 %] 97 %  BP: ()/(64-76) 106/68     Weight: 70.3 kg (155 lb)  Body mass index is 21.02 kg/m².  No intake or output data in the 24 hours ending 05/29/23 1305      Physical Exam  Constitutional:       Appearance: Normal appearance. He is normal weight.   HENT:      Head: Normocephalic and atraumatic.      Mouth/Throat:      Mouth: Mucous membranes are moist.   Eyes:      Extraocular Movements: Extraocular movements intact.      Conjunctiva/sclera: Conjunctivae normal.      Pupils: Pupils are equal, round, and reactive to light.   Cardiovascular:      Rate and Rhythm: Normal  rate and regular rhythm.      Heart sounds: No murmur heard.  Pulmonary:      Effort: Pulmonary effort is normal. No respiratory distress.      Breath sounds: Normal breath sounds. No wheezing or rales.   Abdominal:      General: Abdomen is flat. There is no distension.      Palpations: Abdomen is soft.      Tenderness: There is no abdominal tenderness. There is no guarding.   Musculoskeletal:         General: No swelling or tenderness.      Right lower leg: No edema.      Left lower leg: No edema.   Skin:     Findings: No rash.   Neurological:      General: No focal deficit present.      Mental Status: He is alert and oriented to person, place, and time. Mental status is at baseline.      Motor: Weakness present.   Psychiatric:         Mood and Affect: Mood normal.         Behavior: Behavior normal.           Significant Labs: All pertinent labs within the past 24 hours have been reviewed.  CBC:   Recent Labs   Lab 05/27/23  1811 05/28/23  0817 05/29/23  0529   WBC 26.04* 29.85* 26.39*   HGB 11.1* 10.2* 10.1*   HCT 34.3* 31.1* 30.2*    259 333     CMP:   Recent Labs   Lab 05/27/23  1811 05/28/23  0817 05/29/23  0529    140 141   K 3.8 4.2 4.3    104 104   CO2 24 27 26   * 159* 167*   BUN 17 14 19   CREATININE 0.8 0.7 0.8   CALCIUM 10.1 9.6 9.3   PROT 7.8 6.7 6.4   ALBUMIN 2.7* 2.4* 2.1*   BILITOT 0.8 0.8 0.4   ALKPHOS 94 111 200*   AST 32 27 277*   ALT 29 36 219*   ANIONGAP 12 9 11       Significant Imaging: I have reviewed all pertinent imaging results/findings within the past 24 hours.

## 2023-05-29 NOTE — PROGRESS NOTES
"[facility])  Infectious Disease  Progress Note    Patient Name: Jesus Jean  MRN: [id number]  Admission Date: 5/27/2023  Length of Stay: 2 days  Attending Physician: Nabil Ardon MD  Primary Care Provider: Primary Doctor No    Isolation Status: No active isolations  Assessment/Plan:      ID  * Meningitis  Bacteremia - strep pneumo  41 yo male with tobacco use admitted with CA-pneumococcal meningitis/bacteremia. CT C/A/P without consolidation or acute infectious foci. Repeat blcx ngtd. CSF culture with GPC - ME panel with strep pneumo. Labs reviewed - leukocytosis improving, WBC 26 today.     Recommendations:  -continue vancomycin pharm to dose and ceftriaxone pending cx data/susceptibilities   -monitor for nephrotoxicity/hepatotoxicity with CMP   -mild LFT bump noted on lab work today, hep studies negative  -continue dexamethasone x 4d   -echo for completeness  -follow up cx data              Thank you for your consult. I will follow-up with patient. Please contact us if you have any additional questions. Above d/w primary team.       Inna Fields MD  Infectious Disease  [facility])    Subjective:     Principal Problem:Meningitis    HPI:    41 yo man, s/p a trip to [facility] [place], developed fever, headache, and stiff neck. No know ill contacts. Had URI symptoms prior. Presented to the ED with generalized body aches, chills, nausea, vomiting.  He reports that "everything hurts." Denies any vision changes numbness or weakness in his arms or legs. Underwent CSF analysis c/w pyogenic meningitis. Started on broad abx. ME panel c/w Strep pneumo. Blood and CSF cultures growing the same. ID is consulted for meningitis. Of note, HIV testing is negative.    Interval History: No fevers documented overnight. Pt reports tolerating abx without issues. Pt stated he did not get his pneumococcal vaccine.       Review of Systems   Constitutional:  Negative for chills and " fever.   Objective:     Vital Signs (Most Recent):  Temp: 98.5 °F (36.9 °C) (05/29/23 0659)  Pulse: (!) 42 (05/29/23 0659)  Resp: 16 (05/29/23 0659)  BP: 101/70 (05/29/23 0659)  SpO2: 97 % (05/29/23 0659) Vital Signs (24h Range):  Temp:  [96.8 °F (36 °C)-100.1 °F (37.8 °C)] 98.5 °F (36.9 °C)  Pulse:  [42-69] 42  Resp:  [16-20] 16  SpO2:  [95 %-98 %] 97 %  BP: ()/(57-76) 101/70     Weight: 70.3 kg (155 lb)  Body mass index is 21.02 kg/m².    Estimated Creatinine Clearance: 118.4 mL/min (based on SCr of 0.8 mg/dL).     Physical Exam  Constitutional:       General: He is not in acute distress.     Appearance: He is not toxic-appearing.   HENT:      Head: Normocephalic and atraumatic.      Right Ear: External ear normal.      Left Ear: External ear normal.      Mouth/Throat:      Mouth: Mucous membranes are moist.   Eyes:      General: No scleral icterus.        Right eye: No discharge.         Left eye: No discharge.   Pulmonary:      Effort: Pulmonary effort is normal. No respiratory distress.   Abdominal:      General: There is no distension.      Palpations: Abdomen is soft.      Tenderness: There is no abdominal tenderness. There is no guarding.   Musculoskeletal:      Cervical back: No rigidity or tenderness.      Right lower leg: No edema.      Left lower leg: No edema.   Skin:     General: Skin is warm and dry.   Neurological:      Mental Status: He is alert.        Significant Labs:   Microbiology Results (last 7 days)       Procedure Component Value Units Date/Time    Blood culture [[id number]] Collected: 05/28/23 1326    Order Status: Completed Specimen: Blood Updated: 05/29/23 0115     Blood Culture, Routine No Growth to date    Blood culture [[id number]] Collected: 05/28/23 1326    Order Status: Completed Specimen: Blood Updated: 05/29/23 0115     Blood Culture, Routine No Growth to date    Blood culture x two cultures. Draw prior to antibiotics. [[id number]] Collected: 05/27/23 2052    Order Status:  Completed Specimen: Blood from Peripheral, Forearm, Left Updated: 05/28/23 1107     Blood Culture, Routine Gram stain aer bottle: Gram positive cocci in chains resembling Strep      Positive results previously called 05/28/2023  08:53      Gram stain daria bottle: Gram positive cocci in chains resembling Strep      Positive results previously called 05/28/2023  11:07    Narrative:      Aerobic and anaerobic    CSF culture [[id number]] Collected: 05/28/23 0008    Order Status: Completed Specimen: CSF (Spinal Fluid) Updated: 05/28/23 0934     Gram Stain Result Cytospin indicates:      Many WBC's      Many Gram positive cocci      Results called to and read back by:Steven Cole 05/28/2023  09:33    Narrative:      ADD ON CSF CULTURE PER DR JÚNIOR SILVER/ORDER# [id number] @ 1:22AM    Blood culture x two cultures. Draw prior to antibiotics. [[id number]] Collected: 05/27/23 1917    Order Status: Completed Specimen: Blood from Peripheral, Antecubital, Left Updated: 05/28/23 0633     Blood Culture, Routine Gram stain aer bottle: Gram positive cocci in chains resembling Strep      Results called to and read back by:Roz Chapman RN 05/28/2023  04:56      Gram stain daria bottle: Gram positive cocci in chains resembling Strep      Positive results previously called 05/28/2023  06:32    Narrative:      Aerobic and anaerobic    Rapid Organism ID by PCR (from Blood culture) [[id number]]  (Abnormal) Collected: 05/27/23 1917    Order Status: Completed Updated: 05/28/23 0623     Enterococcus faecalis Not Detected     Enterococcus faecium Not Detected     Listeria Monocytogenes Not Detected     Staphylococcus spp. Not Detected     Staphylococcus aureus Not Detected     Staphylococcus epidermidis Not Detected     Staphylococcus lugdunensis Not Detected     Streptococcus species See species for ID     Streptococcus agalactiae Not Detected     Streptococcus pneumoniae Detected     Streptococcus pyogenes Not Detected     Acinetobacter  calcoaceticus/baumannii complex Not Detected     Bacteroides fragilis Not Detected     Enterobacerales Not Detected     Enterobacter cloacae complex Not Detected     Escherichia Not Detected     Klebsiella aerogenes Not Detected     Klebsiella oxytoca Not Detected     Klebsiella pneumoniae group Not Detected     Proteus Not Detected     Salmonella sp Not Detected     Serratia marcescens Not Detected     Haemophilus influenzae Not Detected     Neisseria meningtidis Not Detected     Pseudomonas aeruginosa Not Detected     Stenotrophomonas maltophilia Not Detected     Candida albicans Not Detected     Candida auris Not Detected     Candida glabrata Not Detected     Candida krusei Not Detected     Candida parapsilosis Not Detected     Candida tropicalis Not Detected     Cryptococcus neoformans/gattii Not Detected     CTX-M (ESBL ) Not Detected     IMP (Carbapenem resistant) Not Detected     KPC resistance gene (Carbapenem resistant) Not Detected     mcr-1  Not Detected     mec A/C  Not Detected     mec A/C and MREJ (MRSA) gene Not Detected     NDM (Carbapenem resistant) Not Detected     OXA-48-like (Carbapenem resistant) Not Detected     van A/B (VRE gene) Not Detected     VIM (Carbapenem resistant) Not Detected    Narrative:      Aerobic and anaerobic    CSF culture and Gram Stain (Tube 2) [[id number]]     Order Status: Completed Specimen: CSF (Spinal Fluid) from CSF Tap, Tube 2     CSF culture and Gram Stain (Tube 2) [[id number]]     Order Status: Canceled Specimen: CSF (Spinal Fluid) from CSF Tap, Tube 2     Influenza A & B by Molecular [[id number]] Collected: 05/27/23 1905    Order Status: Completed Specimen: Nasopharyngeal Swab Updated: 05/27/23 1938     Influenza A, Molecular Negative     Influenza B, Molecular Negative     Flu A & B Source Nasal swab            Significant Imaging: I have reviewed all pertinent imaging results/findings within the past 24 hours.

## 2023-05-30 LAB
ALBUMIN SERPL BCP-MCNC: 2.2 G/DL (ref 3.5–5.2)
ALP SERPL-CCNC: 151 U/L (ref 55–135)
ALT SERPL W/O P-5'-P-CCNC: 277 U/L (ref 10–44)
ANION GAP SERPL CALC-SCNC: 11 MMOL/L (ref 8–16)
ANISOCYTOSIS BLD QL SMEAR: ABNORMAL
ASCENDING AORTA: 2.82 CM
AST SERPL-CCNC: 164 U/L (ref 10–40)
AV INDEX (PROSTH): 0.82
AV MEAN GRADIENT: 5 MMHG
AV PEAK GRADIENT: 8 MMHG
AV VALVE AREA: 3.18 CM2
AV VELOCITY RATIO: 0.81
BACTERIA BLD CULT: ABNORMAL
BASOPHILS # BLD AUTO: 0.05 K/UL (ref 0–0.2)
BASOPHILS NFR BLD: 0.2 % (ref 0–1.9)
BILIRUB SERPL-MCNC: 0.3 MG/DL (ref 0.1–1)
BSA FOR ECHO PROCEDURE: 1.89 M2
BUN SERPL-MCNC: 17 MG/DL (ref 6–20)
CALCIUM SERPL-MCNC: 9.2 MG/DL (ref 8.7–10.5)
CHLORIDE SERPL-SCNC: 104 MMOL/L (ref 95–110)
CO2 SERPL-SCNC: 24 MMOL/L (ref 23–29)
CREAT SERPL-MCNC: 0.7 MG/DL (ref 0.5–1.4)
CV ECHO LV RWT: 0.34 CM
DIFFERENTIAL METHOD: ABNORMAL
DOP CALC AO PEAK VEL: 1.44 M/S
DOP CALC AO VTI: 31.82 CM
DOP CALC LVOT AREA: 3.9 CM2
DOP CALC LVOT DIAMETER: 2.22 CM
DOP CALC LVOT PEAK VEL: 1.17 M/S
DOP CALC LVOT STROKE VOLUME: 101.09 CM3
DOP CALCLVOT PEAK VEL VTI: 26.13 CM
E WAVE DECELERATION TIME: 106.18 MSEC
E/A RATIO: 2.11
E/E' RATIO: 8.25 M/S
ECHO LV POSTERIOR WALL: 0.88 CM (ref 0.6–1.1)
EJECTION FRACTION: 65 %
EOSINOPHIL # BLD AUTO: 0 K/UL (ref 0–0.5)
EOSINOPHIL NFR BLD: 0 % (ref 0–8)
ERYTHROCYTE [DISTWIDTH] IN BLOOD BY AUTOMATED COUNT: 13.9 % (ref 11.5–14.5)
EST. GFR  (NO RACE VARIABLE): >60 ML/MIN/1.73 M^2
FRACTIONAL SHORTENING: 33 % (ref 28–44)
GLUCOSE SERPL-MCNC: 128 MG/DL (ref 70–110)
HCT VFR BLD AUTO: 30.7 % (ref 40–54)
HGB BLD-MCNC: 10.3 G/DL (ref 14–18)
IMM GRANULOCYTES # BLD AUTO: 0.22 K/UL (ref 0–0.04)
IMM GRANULOCYTES NFR BLD AUTO: 0.9 % (ref 0–0.5)
INTERVENTRICULAR SEPTUM: 0.7 CM (ref 0.6–1.1)
IVRT: 102.76 MSEC
LA MAJOR: 5.71 CM
LA MINOR: 6.28 CM
LA WIDTH: 4.25 CM
LEFT ATRIUM SIZE: 2.8 CM
LEFT ATRIUM VOLUME INDEX MOD: 32.1 ML/M2
LEFT ATRIUM VOLUME INDEX: 31.7 ML/M2
LEFT ATRIUM VOLUME MOD: 61.25 CM3
LEFT ATRIUM VOLUME: 60.5 CM3
LEFT INTERNAL DIMENSION IN SYSTOLE: 3.44 CM (ref 2.1–4)
LEFT VENTRICLE DIASTOLIC VOLUME INDEX: 66.15 ML/M2
LEFT VENTRICLE DIASTOLIC VOLUME: 126.34 ML
LEFT VENTRICLE MASS INDEX: 73 G/M2
LEFT VENTRICLE SYSTOLIC VOLUME INDEX: 25.5 ML/M2
LEFT VENTRICLE SYSTOLIC VOLUME: 48.76 ML
LEFT VENTRICULAR INTERNAL DIMENSION IN DIASTOLE: 5.14 CM (ref 3.5–6)
LEFT VENTRICULAR MASS: 140.1 G
LV LATERAL E/E' RATIO: 16.5 M/S
LV SEPTAL E/E' RATIO: 5.5 M/S
LYMPHOCYTES # BLD AUTO: 0.9 K/UL (ref 1–4.8)
LYMPHOCYTES NFR BLD: 3.7 % (ref 18–48)
MAGNESIUM SERPL-MCNC: 2.1 MG/DL (ref 1.6–2.6)
MCH RBC QN AUTO: 23.4 PG (ref 27–31)
MCHC RBC AUTO-ENTMCNC: 33.6 G/DL (ref 32–36)
MCV RBC AUTO: 70 FL (ref 82–98)
MONOCYTES # BLD AUTO: 1 K/UL (ref 0.3–1)
MONOCYTES NFR BLD: 4.1 % (ref 4–15)
MV PEAK A VEL: 0.47 M/S
MV PEAK E VEL: 0.99 M/S
MV STENOSIS PRESSURE HALF TIME: 30.79 MS
MV VALVE AREA P 1/2 METHOD: 7.15 CM2
NEUTROPHILS # BLD AUTO: 21.6 K/UL (ref 1.8–7.7)
NEUTROPHILS NFR BLD: 91.1 % (ref 38–73)
NRBC BLD-RTO: 0 /100 WBC
PHOSPHATE SERPL-MCNC: 3.7 MG/DL (ref 2.7–4.5)
PISA TR MAX VEL: 2.55 M/S
PLATELET # BLD AUTO: 420 K/UL (ref 150–450)
PLATELET BLD QL SMEAR: ABNORMAL
PMV BLD AUTO: 12.6 FL (ref 9.2–12.9)
POIKILOCYTOSIS BLD QL SMEAR: SLIGHT
POTASSIUM SERPL-SCNC: 4 MMOL/L (ref 3.5–5.1)
PROT SERPL-MCNC: 6.4 G/DL (ref 6–8.4)
RA MAJOR: 4.93 CM
RA PRESSURE: 3 MMHG
RA WIDTH: 3.69 CM
RBC # BLD AUTO: 4.41 M/UL (ref 4.6–6.2)
RIGHT VENTRICULAR END-DIASTOLIC DIMENSION: 3.34 CM
SCHISTOCYTES BLD QL SMEAR: ABNORMAL
SCHISTOCYTES BLD QL SMEAR: PRESENT
SINUS: 3.42 CM
SODIUM SERPL-SCNC: 139 MMOL/L (ref 136–145)
STJ: 2.56 CM
TARGETS BLD QL SMEAR: ABNORMAL
TDI LATERAL: 0.06 M/S
TDI SEPTAL: 0.18 M/S
TDI: 0.12 M/S
TR MAX PG: 26 MMHG
TRICUSPID ANNULAR PLANE SYSTOLIC EXCURSION: 2.05 CM
TV REST PULMONARY ARTERY PRESSURE: 29 MMHG
WBC # BLD AUTO: 23.67 K/UL (ref 3.9–12.7)

## 2023-05-30 PROCEDURE — 97165 OT EVAL LOW COMPLEX 30 MIN: CPT

## 2023-05-30 PROCEDURE — 99233 SBSQ HOSP IP/OBS HIGH 50: CPT | Mod: ,,, | Performed by: STUDENT IN AN ORGANIZED HEALTH CARE EDUCATION/TRAINING PROGRAM

## 2023-05-30 PROCEDURE — 80053 COMPREHEN METABOLIC PANEL: CPT | Performed by: STUDENT IN AN ORGANIZED HEALTH CARE EDUCATION/TRAINING PROGRAM

## 2023-05-30 PROCEDURE — 11000001 HC ACUTE MED/SURG PRIVATE ROOM

## 2023-05-30 PROCEDURE — 63600175 PHARM REV CODE 636 W HCPCS: Performed by: STUDENT IN AN ORGANIZED HEALTH CARE EDUCATION/TRAINING PROGRAM

## 2023-05-30 PROCEDURE — 63600175 PHARM REV CODE 636 W HCPCS: Performed by: HOSPITALIST

## 2023-05-30 PROCEDURE — 99233 PR SUBSEQUENT HOSPITAL CARE,LEVL III: ICD-10-PCS | Mod: ,,, | Performed by: STUDENT IN AN ORGANIZED HEALTH CARE EDUCATION/TRAINING PROGRAM

## 2023-05-30 PROCEDURE — 83735 ASSAY OF MAGNESIUM: CPT | Performed by: STUDENT IN AN ORGANIZED HEALTH CARE EDUCATION/TRAINING PROGRAM

## 2023-05-30 PROCEDURE — 25000003 PHARM REV CODE 250: Performed by: HOSPITALIST

## 2023-05-30 PROCEDURE — 97530 THERAPEUTIC ACTIVITIES: CPT

## 2023-05-30 PROCEDURE — 97161 PT EVAL LOW COMPLEX 20 MIN: CPT

## 2023-05-30 PROCEDURE — 85025 COMPLETE CBC W/AUTO DIFF WBC: CPT | Performed by: STUDENT IN AN ORGANIZED HEALTH CARE EDUCATION/TRAINING PROGRAM

## 2023-05-30 PROCEDURE — 25000003 PHARM REV CODE 250: Performed by: STUDENT IN AN ORGANIZED HEALTH CARE EDUCATION/TRAINING PROGRAM

## 2023-05-30 PROCEDURE — 36415 COLL VENOUS BLD VENIPUNCTURE: CPT | Performed by: STUDENT IN AN ORGANIZED HEALTH CARE EDUCATION/TRAINING PROGRAM

## 2023-05-30 PROCEDURE — 97116 GAIT TRAINING THERAPY: CPT

## 2023-05-30 PROCEDURE — 84100 ASSAY OF PHOSPHORUS: CPT | Performed by: STUDENT IN AN ORGANIZED HEALTH CARE EDUCATION/TRAINING PROGRAM

## 2023-05-30 PROCEDURE — 97535 SELF CARE MNGMENT TRAINING: CPT

## 2023-05-30 RX ORDER — OXYCODONE AND ACETAMINOPHEN 5; 325 MG/1; MG/1
1 TABLET ORAL ONCE
Status: COMPLETED | OUTPATIENT
Start: 2023-05-30 | End: 2023-05-30

## 2023-05-30 RX ORDER — DEXTROSE, SODIUM CHLORIDE, SODIUM LACTATE, POTASSIUM CHLORIDE, AND CALCIUM CHLORIDE 5; .6; .31; .03; .02 G/100ML; G/100ML; G/100ML; G/100ML; G/100ML
INJECTION, SOLUTION INTRAVENOUS CONTINUOUS
Status: ACTIVE | OUTPATIENT
Start: 2023-05-30 | End: 2023-05-30

## 2023-05-30 RX ORDER — OXYCODONE HYDROCHLORIDE 5 MG/1
5 TABLET ORAL EVERY 8 HOURS PRN
Status: DISCONTINUED | OUTPATIENT
Start: 2023-05-30 | End: 2023-05-31 | Stop reason: HOSPADM

## 2023-05-30 RX ORDER — HYDROMORPHONE HYDROCHLORIDE 1 MG/ML
2 INJECTION, SOLUTION INTRAMUSCULAR; INTRAVENOUS; SUBCUTANEOUS ONCE
Status: DISCONTINUED | OUTPATIENT
Start: 2023-05-30 | End: 2023-05-30

## 2023-05-30 RX ORDER — METHOCARBAMOL 500 MG/1
500 TABLET, FILM COATED ORAL 3 TIMES DAILY
Status: DISCONTINUED | OUTPATIENT
Start: 2023-05-30 | End: 2023-05-31 | Stop reason: HOSPADM

## 2023-05-30 RX ORDER — ACETAMINOPHEN 325 MG/1
650 TABLET ORAL EVERY 4 HOURS PRN
Status: DISCONTINUED | OUTPATIENT
Start: 2023-05-30 | End: 2023-05-31 | Stop reason: HOSPADM

## 2023-05-30 RX ORDER — SENNOSIDES 8.6 MG/1
8.6 TABLET ORAL DAILY
Status: DISCONTINUED | OUTPATIENT
Start: 2023-05-31 | End: 2023-05-31 | Stop reason: HOSPADM

## 2023-05-30 RX ADMIN — CEFTRIAXONE 2 G: 2 INJECTION, POWDER, FOR SOLUTION INTRAMUSCULAR; INTRAVENOUS at 12:05

## 2023-05-30 RX ADMIN — METHOCARBAMOL 500 MG: 500 TABLET ORAL at 02:05

## 2023-05-30 RX ADMIN — DEXAMETHASONE SODIUM PHOSPHATE 10 MG: 4 INJECTION INTRA-ARTICULAR; INTRALESIONAL; INTRAMUSCULAR; INTRAVENOUS; SOFT TISSUE at 12:05

## 2023-05-30 RX ADMIN — OXYCODONE HYDROCHLORIDE AND ACETAMINOPHEN 1 TABLET: 5; 325 TABLET ORAL at 12:05

## 2023-05-30 RX ADMIN — VANCOMYCIN HYDROCHLORIDE 1250 MG: 1.25 INJECTION, POWDER, LYOPHILIZED, FOR SOLUTION INTRAVENOUS at 10:05

## 2023-05-30 RX ADMIN — VANCOMYCIN HYDROCHLORIDE 1250 MG: 1.25 INJECTION, POWDER, LYOPHILIZED, FOR SOLUTION INTRAVENOUS at 01:05

## 2023-05-30 RX ADMIN — DEXAMETHASONE SODIUM PHOSPHATE 10 MG: 4 INJECTION INTRA-ARTICULAR; INTRALESIONAL; INTRAMUSCULAR; INTRAVENOUS; SOFT TISSUE at 05:05

## 2023-05-30 RX ADMIN — METHOCARBAMOL 500 MG: 500 TABLET ORAL at 08:05

## 2023-05-30 RX ADMIN — Medication 6 MG: at 08:05

## 2023-05-30 RX ADMIN — ACETAMINOPHEN 650 MG: 325 TABLET ORAL at 08:05

## 2023-05-30 RX ADMIN — VANCOMYCIN HYDROCHLORIDE 1500 MG: 1.5 INJECTION, POWDER, LYOPHILIZED, FOR SOLUTION INTRAVENOUS at 04:05

## 2023-05-30 RX ADMIN — SODIUM CHLORIDE, SODIUM LACTATE, POTASSIUM CHLORIDE, CALCIUM CHLORIDE AND DEXTROSE MONOHYDRATE: 5; 600; 310; 30; 20 INJECTION, SOLUTION INTRAVENOUS at 12:05

## 2023-05-30 RX ADMIN — DEXAMETHASONE SODIUM PHOSPHATE 10 MG: 4 INJECTION INTRA-ARTICULAR; INTRALESIONAL; INTRAMUSCULAR; INTRAVENOUS; SOFT TISSUE at 06:05

## 2023-05-30 RX ADMIN — METHOCARBAMOL 500 MG: 500 TABLET ORAL at 04:05

## 2023-05-30 NOTE — NURSING
Patient resting comfortably with no s/sx of distress noted. Received another call from Tele stating patients leads off. I explained I keep replacing them and he pulls them off, will check with Team to see if Tele is necessary.

## 2023-05-30 NOTE — PT/OT/SLP EVAL
Physical Therapy Evaluation and Discharge Note    Patient Name:  Jesus Jean   MRN:  [id number]  Co-evaluation w/ OT 2/2 suspected pt complexity and requirement of assistance from 2 skilled therapists to maximize treatment potential and maintain pt safety   Recommendations:     Discharge Recommendations: other (see comments)  Discharge Equipment Recommendations: none   Barriers to discharge:  homeless    Assessment:     Jesus Jean is a 42 y.o. male admitted with a medical diagnosis of Meningitis. .  At this time, patient is functioning at their prior level of function and does not require further acute PT services.     Recent Surgery: * No surgery found *      Plan:     During this hospitalization, patient does not require further acute PT services.  Please re-consult if situation changes.      Subjective     Chief Complaint: pain all over and L hand swelling, RN informed of severe hand swelling and to escalate to team to assess need for hand imaging  Patient/Family Comments/goals: pt wants to get pain controlled and get strength back  Pain/Comfort:  Pain Rating 1: 7/10  Location 1:  (everywhere)  Pain Addressed 1: Reposition, Distraction  Pain Rating Post-Intervention 1: 7/10    Patients cultural, spiritual, Worship conflicts given the current situation: no    Living Environment:  Pt is homeless, was awaiting section 8 housing which fell through. He has been bouncing from place to place w/ his SO and their 2 6 month olds.  Prior to admission, patients level of function was independent.  Equipment used at home: none.  DME owned (not currently used): none.  Upon discharge, patient will have assistance from SO.    Objective:     Communicated with Rn prior to session.  Patient found HOB elevated with telemetry, bed alarm upon PT entry to room.    General Precautions: Standard, fall    Orthopedic Precautions:N/A   Braces: N/A  Respiratory Status: Room air    Exams:  Cognitive Exam:  Patient is oriented  to Person, Place, Time, and Situation  Gross Motor Coordination:  WFL  Sensation:    -       Intact  RLE ROM: WFL  RLE Strength: WFL  LLE ROM: WFL  LLE Strength: WFL    Functional Mobility:  Bed Mobility:     Supine to Sit: independence  Sit to Supine: independence  Transfers:     Sit to Stand:  supervision with no AD  Gait: >350' w/ supervision for safety awareness/obstacle avoidance  Tinetti Total Score: 26  < 18 = High Risk of Falls, 19-23 = Moderate Risk of Falls, > 24 = Low Risk of Falls    AM-PAC 6 CLICK MOBILITY  Total Score:20       Treatment and Education:  Gait training w/ cueing for postural control, safety awareness, obstacle avoidance, and attention to task   Donned new gown in long sit w/ minimal cueing for arm placement through sleeves  Dynamic standing balance w/ supervision for safety while performing ADLs w/ OT  Pt educated on PT assessment and plan to d/c orders 2/2 no current needs for skilled PT. Pt instructed to slowly progress mobility each day towards PLOF without attempting to jump right into previous daily routine immediately upon d/c 2/2 general deconditioning from being in hospital. Pt in agreement w/ POC and verbalized understanding w/ plan to slowly progress to normal daily activities at home and inform MD team if pt experiences any difficulty progressing back to PLOF while here or upon d/c in order for PT to be re-consulted/orders placed for OP PT as needed.     AM-PAC 6 CLICK MOBILITY  Total Score:20     Patient left HOB elevated with all lines intact, call button in reach, bed alarm on, and RN notified.    GOALS:   Multidisciplinary Problems       Physical Therapy Goals       Not on file                    History:     History reviewed. No pertinent past medical history.    History reviewed. No pertinent surgical history.    Time Tracking:     PT Received On: 05/30/23  PT Start Time: 1120     PT Stop Time: 1150  PT Total Time (min): 30 min     Billable Minutes: Evaluation 5, Gait  Training 15, and Therapeutic Activity 10      05/30/2023
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
## 2023-05-30 NOTE — ASSESSMENT & PLAN NOTE
- Noted on 5/29 during treatment for Stept pneumo meningitis. Possible due to dehydration.   - Negative HepC Ab  - Acute hepatitis panel unremarkable  - May need liver US  - Improving  - Trend CMP

## 2023-05-30 NOTE — PROGRESS NOTES
Pharmacokinetic Assessment Follow Up: IV Vancomycin    Vancomycin Regimen Plan:    Vancomycin trough resulted as subtherapeutic yesterday afternoon  Initially the dose was changed from 1.25 g IV q12h to 1.5 g IV q12h however, given how low the vancomycin trough is its unlikely a q12h regimen will result in a therapeutic trough  Considering we are treating meningitis, will be more aggressive and change the patients vancomycin regimen to 1.25 g IV q8h  Plan for a trough prior to the fourth dose on 5/31 at 1200    Drug levels (last 3 results):  Recent Labs   Lab Result Units 05/29/23  1537   Vancomycin-Trough ug/mL 6.7*     Pharmacy will continue to follow and monitor vancomycin.    Please contact pharmacy at extension [phone] for questions regarding this assessment.    Thank you for the consult,   Omar Roberts, PharmD, [facility]      Patient brief summary:  Jesus Jean is a 42 y.o. male initiated on antimicrobial therapy with IV Vancomycin for treatment of meningitis    Drug Allergies:   Review of patient's allergies indicates:   Allergen Reactions    Sulfa (sulfonamide antibiotics) Other (See Comments)     Unknown per pt       Actual Body Weight:   70.3 kg    Renal Function:   Estimated Creatinine Clearance: 135.3 mL/min (based on SCr of 0.7 mg/dL).,     Dialysis Method (if applicable):  N/A    CBC (last 72 hours):  Recent Labs   Lab Result Units 05/27/23  1811 05/28/23  0817 05/29/23  0529 05/30/23  0457   WBC K/uL 26.04* 29.85* 26.39* 23.67*   Hemoglobin g/dL 11.1* 10.2* 10.1* 10.3*   Hematocrit % 34.3* 31.1* 30.2* 30.7*   Platelets K/uL 276 259 333 420   Gran % % 90.8* 92.3* 86.0* 91.1*   Lymph % % 2.6* 2.5* 3.0* 3.7*   Mono % % 5.0 4.0 2.0* 4.1   Eosinophil % % 0.0 0.1 0.0 0.0   Basophil % % 0.2 0.2 0.0 0.2   Differential Method  Automated Automated Automated Automated       Metabolic Panel (last 72 hours):  Recent Labs   Lab Result Units 05/27/23  1811 05/27/23  2250 05/28/23  0009 05/28/23  0817  05/29/23  0529 05/30/23  0457   Sodium mmol/L 139  --   --  140 141 139   Potassium mmol/L 3.8  --   --  4.2 4.3 4.0   Chloride mmol/L 103  --   --  104 104 104   CO2 mmol/L 24  --   --  27 26 24   Glucose mg/dL 145*  --   --  159* 167* 128*   Glucose, CSF mg/dL  --   --  <5*  --   --   --    Glucose, UA   --  Negative  --   --   --   --    BUN mg/dL 17  --   --  14 19 17   Creatinine mg/dL 0.8  --   --  0.7 0.8 0.7   Albumin g/dL 2.7*  --   --  2.4* 2.1* 2.2*   Total Bilirubin mg/dL 0.8  --   --  0.8 0.4 0.3   Alkaline Phosphatase U/L 94  --   --  111 200* 151*   AST U/L 32  --   --  27 277* 164*   ALT U/L 29  --   --  36 219* 277*   Magnesium mg/dL  --   --   --  2.2 2.3 2.1   Phosphorus mg/dL  --   --   --  2.7 3.4 3.7       Vancomycin Administrations:  vancomycin given in the last 96 hours                     vancomycin 1,500 mg in dextrose 5 % (D5W) 250 mL IVPB (Vial-Mate) (mg) 1,500 mg New Bag 05/30/23 0450    vancomycin 1,250 mg in dextrose 5 % (D5W) 250 mL IVPB (Vial-Mate) (mg) 1,250 mg New Bag 05/29/23 1645     1,250 mg New Bag  0337     1,250 mg New Bag 05/28/23 1557    vancomycin 1,500 mg in dextrose 5 % (D5W) 250 mL IVPB (Vial-Mate) (mg) 1,500 mg New Bag 05/28/23 0417                    Microbiologic Results:  Microbiology Results (last 7 days)       Procedure Component Value Units Date/Time    Blood culture [[id number]] Collected: 05/28/23 1326    Order Status: Completed Specimen: Blood Updated: 05/29/23 1812     Blood Culture, Routine No Growth to date      No Growth to date    Blood culture [[id number]] Collected: 05/28/23 1326    Order Status: Completed Specimen: Blood Updated: 05/29/23 1812     Blood Culture, Routine No Growth to date      No Growth to date    Blood culture x two cultures. Draw prior to antibiotics. [[id number]]  (Abnormal) Collected: 05/27/23 2052    Order Status: Completed Specimen: Blood from Peripheral, Forearm, Left Updated: 05/29/23 1619     Blood Culture, Routine Gram stain aer  bottle: Gram positive cocci in chains resembling Strep      Positive results previously called 05/28/2023  08:53      Gram stain daria bottle: Gram positive cocci in chains resembling Strep      Positive results previously called 05/28/2023  11:07      STREPTOCOCCUS PNEUMONIAE  For susceptibility see order #[id number]      Narrative:      Aerobic and anaerobic    Blood culture x two cultures. Draw prior to antibiotics. [[id number]]  (Abnormal) Collected: 05/27/23 1917    Order Status: Completed Specimen: Blood from Peripheral, Antecubital, Left Updated: 05/29/23 1530     Blood Culture, Routine Gram stain aer bottle: Gram positive cocci in chains resembling Strep      Results called to and read back by:Roz Chapman RN 05/28/2023  04:56      Gram stain daria bottle: Gram positive cocci in chains resembling Strep      Positive results previously called 05/28/2023  06:32      STREPTOCOCCUS PNEUMONIAE  Susceptibility pending      Narrative:      Aerobic and anaerobic    CSF culture [[id number]]  (Abnormal) Collected: 05/28/23 0008    Order Status: Completed Specimen: CSF (Spinal Fluid) Updated: 05/29/23 1525     CSF CULTURE STREPTOCOCCUS PNEUMONIAE  Susceptibility pending       Gram Stain Result Cytospin indicates:      Many WBC's      Many Gram positive cocci      Results called to and read back by:Steven Cole 05/28/2023  09:33    Narrative:      ADD ON CSF CULTURE PER DR JÚNIOR SILVER/ORDER# [id number] @ 1:22AM    Rapid Organism ID by PCR (from Blood culture) [[id number]]  (Abnormal) Collected: 05/27/23 1917    Order Status: Completed Updated: 05/28/23 0623     Enterococcus faecalis Not Detected     Enterococcus faecium Not Detected     Listeria Monocytogenes Not Detected     Staphylococcus spp. Not Detected     Staphylococcus aureus Not Detected     Staphylococcus epidermidis Not Detected     Staphylococcus lugdunensis Not Detected     Streptococcus species See species for ID     Streptococcus agalactiae Not  Detected     Streptococcus pneumoniae Detected     Streptococcus pyogenes Not Detected     Acinetobacter calcoaceticus/baumannii complex Not Detected     Bacteroides fragilis Not Detected     Enterobacerales Not Detected     Enterobacter cloacae complex Not Detected     Escherichia Not Detected     Klebsiella aerogenes Not Detected     Klebsiella oxytoca Not Detected     Klebsiella pneumoniae group Not Detected     Proteus Not Detected     Salmonella sp Not Detected     Serratia marcescens Not Detected     Haemophilus influenzae Not Detected     Neisseria meningtidis Not Detected     Pseudomonas aeruginosa Not Detected     Stenotrophomonas maltophilia Not Detected     Candida albicans Not Detected     Candida auris Not Detected     Candida glabrata Not Detected     Candida krusei Not Detected     Candida parapsilosis Not Detected     Candida tropicalis Not Detected     Cryptococcus neoformans/gattii Not Detected     CTX-M (ESBL ) Not Detected     IMP (Carbapenem resistant) Not Detected     KPC resistance gene (Carbapenem resistant) Not Detected     mcr-1  Not Detected     mec A/C  Not Detected     mec A/C and MREJ (MRSA) gene Not Detected     NDM (Carbapenem resistant) Not Detected     OXA-48-like (Carbapenem resistant) Not Detected     van A/B (VRE gene) Not Detected     VIM (Carbapenem resistant) Not Detected    Narrative:      Aerobic and anaerobic    CSF culture and Gram Stain (Tube 2) [[id number]]     Order Status: Completed Specimen: CSF (Spinal Fluid) from CSF Tap, Tube 2     CSF culture and Gram Stain (Tube 2) [[id number]]     Order Status: Canceled Specimen: CSF (Spinal Fluid) from CSF Tap, Tube 2     Influenza A & B by Molecular [[id number]] Collected: 05/27/23 1905    Order Status: Completed Specimen: Nasopharyngeal Swab Updated: 05/27/23 1938     Influenza A, Molecular Negative     Influenza B, Molecular Negative     Flu A & B Source Nasal swab

## 2023-05-30 NOTE — ASSESSMENT & PLAN NOTE
Concern for meningitis after presenting with 3 days increased confusion, HA, N/V, fevers, chills, body aches and generalized weakness     - Admit CTH showed increased ventricular size  - MRI Brain stable with no acute infarct  - s/p LP in ED  - IVF rehydration   - Infectious disease consulted  -- LP studies positive for Strep pneump  -- Continue CTX and Vancomycin   -- Dexamethasone x4 days, sot 5/28  -- Blood culture with Strep penumo   -- Repeat blood culture with no growth   -- TTE on 5/29 with concern for AV vegetation   -- JAIRO ordered  - PT/OT consulted

## 2023-05-30 NOTE — PLAN OF CARE
"  Problem: Adult Inpatient Plan of Care  Goal: Plan of Care Review  Outcome: Ongoing, Progressing     Problem: Adult Inpatient Plan of Care  Goal: Optimal Comfort and Wellbeing  Outcome: Ongoing, Progressing     Problem: Adult Inpatient Plan of Care  Goal: Readiness for Transition of Care  Outcome: Ongoing, Progressing     Problem: Infection  Goal: Absence of Infection Signs and Symptoms  Outcome: Ongoing, Progressing   Patient very restless, states he cannot "believe how bad a LP hurts" I explained the reason behind his pain and offered Tylenol but it is ineffective for him. New orders noted and prn Percocet given. Is up and down, difficult to redirect at times. Had a long hot shower and that seemed to calm him for a little while.  IV site causing him a lot of discomfort, new line in to right upper arm, flushed and intact. Will hang IV once pain meds have kicked in. Uses urinal at the bedside. Refused bed alarm. Bed in lowest position with SR's up times 2 and call light in reach. VSS. Refused SCD's  "

## 2023-05-30 NOTE — SUBJECTIVE & OBJECTIVE
Interval History: No fevers documented overnight.   Pt reports tolerating abx without issues. He states HAs are better.        Review of Systems   Constitutional:  Negative for chills and fever.   Objective:     Vital Signs (Most Recent):  Temp: 98.5 °F (36.9 °C) (05/30/23 1103)  Pulse: (!) 51 (05/30/23 1103)  Resp: 18 (05/30/23 1103)  BP: 121/77 (05/30/23 1103)  SpO2: 97 % (05/30/23 1103) Vital Signs (24h Range):  Temp:  [98.1 °F (36.7 °C)-100 °F (37.8 °C)] 98.5 °F (36.9 °C)  Pulse:  [45-62] 51  Resp:  [16-20] 18  SpO2:  [97 %-98 %] 97 %  BP: ()/(62-83) 121/77     Weight: 70.3 kg (155 lb)  Body mass index is 21.02 kg/m².    Estimated Creatinine Clearance: 135.3 mL/min (based on SCr of 0.7 mg/dL).     Physical Exam  Constitutional:       General: He is not in acute distress.     Appearance: He is not toxic-appearing.   HENT:      Head: Normocephalic and atraumatic.      Right Ear: External ear normal.      Left Ear: External ear normal.      Mouth/Throat:      Mouth: Mucous membranes are moist.   Eyes:      General: No scleral icterus.        Right eye: No discharge.         Left eye: No discharge.   Pulmonary:      Effort: Pulmonary effort is normal. No respiratory distress.   Abdominal:      General: There is no distension.      Palpations: Abdomen is soft.      Tenderness: There is no abdominal tenderness. There is no guarding.   Musculoskeletal:      Cervical back: No rigidity or tenderness.      Right lower leg: No edema.      Left lower leg: No edema.   Skin:     General: Skin is warm and dry.   Neurological:      Mental Status: He is alert.        Significant Labs:   Microbiology Results (last 7 days)       Procedure Component Value Units Date/Time    Blood culture x two cultures. Draw prior to antibiotics. [[id number]]  (Abnormal) Collected: 05/27/23 2052    Order Status: Completed Specimen: Blood from Peripheral, Forearm, Left Updated: 05/30/23 1037     Blood Culture, Routine Gram stain aer bottle:  Gram positive cocci in chains resembling Strep      Positive results previously called 05/28/2023  08:53      Gram stain daria bottle: Gram positive cocci in chains resembling Strep      Positive results previously called 05/28/2023  11:07      STREPTOCOCCUS PNEUMONIAE  For susceptibility see order #[id number]      Narrative:      Aerobic and anaerobic    Blood culture x two cultures. Draw prior to antibiotics. [[id number]]  (Abnormal)  (Susceptibility) Collected: 05/27/23 1917    Order Status: Completed Specimen: Blood from Peripheral, Antecubital, Left Updated: 05/30/23 1036     Blood Culture, Routine Gram stain aer bottle: Gram positive cocci in chains resembling Strep      Results called to and read back by:Roz Chapman RN 05/28/2023  04:56      Gram stain daria bottle: Gram positive cocci in chains resembling Strep      Positive results previously called 05/28/2023  06:32      STREPTOCOCCUS PNEUMONIAE    Narrative:      Aerobic and anaerobic    CSF culture [[id number]]  (Abnormal) Collected: 05/28/23 0008    Order Status: Completed Specimen: CSF (Spinal Fluid) Updated: 05/30/23 0828     CSF CULTURE STREPTOCOCCUS PNEUMONIAE  Susceptibility pending       Gram Stain Result Cytospin indicates:      Many WBC's      Many Gram positive cocci      Results called to and read back by:Steven Cole 05/28/2023  09:33    Narrative:      ADD ON CSF CULTURE PER DR JÚNIOR SILVER/ORDER# [id number] @ 1:22AM    Blood culture [[id number]] Collected: 05/28/23 1326    Order Status: Completed Specimen: Blood Updated: 05/29/23 1812     Blood Culture, Routine No Growth to date      No Growth to date    Blood culture [[id number]] Collected: 05/28/23 1326    Order Status: Completed Specimen: Blood Updated: 05/29/23 1812     Blood Culture, Routine No Growth to date      No Growth to date    Rapid Organism ID by PCR (from Blood culture) [[id number]]  (Abnormal) Collected: 05/27/23 1917    Order Status: Completed Updated: 05/28/23 0623      Enterococcus faecalis Not Detected     Enterococcus faecium Not Detected     Listeria Monocytogenes Not Detected     Staphylococcus spp. Not Detected     Staphylococcus aureus Not Detected     Staphylococcus epidermidis Not Detected     Staphylococcus lugdunensis Not Detected     Streptococcus species See species for ID     Streptococcus agalactiae Not Detected     Streptococcus pneumoniae Detected     Streptococcus pyogenes Not Detected     Acinetobacter calcoaceticus/baumannii complex Not Detected     Bacteroides fragilis Not Detected     Enterobacerales Not Detected     Enterobacter cloacae complex Not Detected     Escherichia Not Detected     Klebsiella aerogenes Not Detected     Klebsiella oxytoca Not Detected     Klebsiella pneumoniae group Not Detected     Proteus Not Detected     Salmonella sp Not Detected     Serratia marcescens Not Detected     Haemophilus influenzae Not Detected     Neisseria meningtidis Not Detected     Pseudomonas aeruginosa Not Detected     Stenotrophomonas maltophilia Not Detected     Candida albicans Not Detected     Candida auris Not Detected     Candida glabrata Not Detected     Candida krusei Not Detected     Candida parapsilosis Not Detected     Candida tropicalis Not Detected     Cryptococcus neoformans/gattii Not Detected     CTX-M (ESBL ) Not Detected     IMP (Carbapenem resistant) Not Detected     KPC resistance gene (Carbapenem resistant) Not Detected     mcr-1  Not Detected     mec A/C  Not Detected     mec A/C and MREJ (MRSA) gene Not Detected     NDM (Carbapenem resistant) Not Detected     OXA-48-like (Carbapenem resistant) Not Detected     van A/B (VRE gene) Not Detected     VIM (Carbapenem resistant) Not Detected    Narrative:      Aerobic and anaerobic    CSF culture and Gram Stain (Tube 2) [[id number]]     Order Status: Completed Specimen: CSF (Spinal Fluid) from CSF Tap, Tube 2     CSF culture and Gram Stain (Tube 2) [[id number]]     Order Status: Canceled  Specimen: CSF (Spinal Fluid) from CSF Tap, Tube 2     Influenza A & B by Molecular [[id number]] Collected: 05/27/23 1905    Order Status: Completed Specimen: Nasopharyngeal Swab Updated: 05/27/23 1938     Influenza A, Molecular Negative     Influenza B, Molecular Negative     Flu A & B Source Nasal swab            Significant Imaging: I have reviewed all pertinent imaging results/findings within the past 24 hours.

## 2023-05-30 NOTE — SUBJECTIVE & OBJECTIVE
Interval History:   Patient with increased back pain overnight that was improved with pain medication. Continued improvement in headache. TTE with concern for AV vegetation. JAIRO ordered. Continue antibiotics per ID. PT/OT to see. 1L IVF for dehydration and continued poor PO intake.       Review of Systems   Constitutional:  Positive for appetite change and fatigue. Negative for activity change and chills.   HENT:  Negative for sore throat and trouble swallowing.    Eyes:  Negative for photophobia and discharge.   Respiratory:  Negative for wheezing.    Cardiovascular:  Negative for chest pain and leg swelling.   Gastrointestinal:  Negative for abdominal distention, abdominal pain, constipation, diarrhea and nausea.   Endocrine: Negative for cold intolerance and heat intolerance.   Genitourinary:  Negative for dysuria.   Musculoskeletal:  Positive for back pain. Negative for arthralgias and myalgias.   Skin:  Negative for rash.   Neurological:  Positive for weakness and headaches.   Psychiatric/Behavioral:  Negative for agitation, behavioral problems and confusion.    Objective:     Vital Signs (Most Recent):  Temp: 98.5 °F (36.9 °C) (05/30/23 1103)  Pulse: (!) 51 (05/30/23 1103)  Resp: 18 (05/30/23 1103)  BP: 121/77 (05/30/23 1103)  SpO2: 97 % (05/30/23 1103) Vital Signs (24h Range):  Temp:  [98.1 °F (36.7 °C)-100 °F (37.8 °C)] 98.5 °F (36.9 °C)  Pulse:  [45-62] 51  Resp:  [16-20] 18  SpO2:  [97 %-98 %] 97 %  BP: ()/(62-83) 121/77     Weight: 70.3 kg (155 lb)  Body mass index is 21.02 kg/m².    Intake/Output Summary (Last 24 hours) at 5/30/2023 1217  Last data filed at 5/30/2023 0400  Gross per 24 hour   Intake 720 ml   Output 700 ml   Net 20 ml         Physical Exam  Constitutional:       Appearance: Normal appearance. He is normal weight.   HENT:      Head: Normocephalic and atraumatic.      Mouth/Throat:      Mouth: Mucous membranes are moist.   Eyes:      Extraocular Movements: Extraocular movements  intact.      Conjunctiva/sclera: Conjunctivae normal.      Pupils: Pupils are equal, round, and reactive to light.   Cardiovascular:      Rate and Rhythm: Normal rate and regular rhythm.      Heart sounds: No murmur heard.  Pulmonary:      Effort: Pulmonary effort is normal. No respiratory distress.      Breath sounds: Normal breath sounds. No wheezing or rales.   Abdominal:      General: Abdomen is flat. There is no distension.      Palpations: Abdomen is soft.      Tenderness: There is no abdominal tenderness. There is no guarding.   Musculoskeletal:         General: No swelling or tenderness.      Right lower leg: No edema.      Left lower leg: No edema.   Skin:     Findings: No rash.   Neurological:      General: No focal deficit present.      Mental Status: He is alert and oriented to person, place, and time. Mental status is at baseline.      Motor: Weakness present.   Psychiatric:         Mood and Affect: Mood normal.         Behavior: Behavior normal.           Significant Labs: All pertinent labs within the past 24 hours have been reviewed.  CBC:   Recent Labs   Lab 05/29/23  0529 05/30/23  0457   WBC 26.39* 23.67*   HGB 10.1* 10.3*   HCT 30.2* 30.7*    420     CMP:   Recent Labs   Lab 05/29/23  0529 05/30/23  0457    139   K 4.3 4.0    104   CO2 26 24   * 128*   BUN 19 17   CREATININE 0.8 0.7   CALCIUM 9.3 9.2   PROT 6.4 6.4   ALBUMIN 2.1* 2.2*   BILITOT 0.4 0.3   ALKPHOS 200* 151*   * 164*   * 277*   ANIONGAP 11 11       Significant Imaging: I have reviewed all pertinent imaging results/findings within the past 24 hours.

## 2023-05-30 NOTE — NURSING
"Telemetry called, pt has once again pulled of his leads. Down to put new ones in place but pt declined stating " every time I fall asleep someone comes in here for brenda carlos; it's staying off till I feel like it" no s/sx of distress noted.   "

## 2023-05-30 NOTE — PLAN OF CARE
Problem: Occupational Therapy  Goal: Occupational Therapy Goal  Description: Goals to be met by: 6/13/23     Patient will increase functional independence with ADLs by performing:    Feeding with [place].  UE Dressing with [place].  LE Dressing with [place].  Grooming while standing at sink with [place].  Toileting from toilet with [place] for hygiene and clothing management.   Upper extremity exercise program x20 reps per handout, with independence.    Outcome: Ongoing, Progressing
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
## 2023-05-31 VITALS
HEIGHT: 72 IN | DIASTOLIC BLOOD PRESSURE: 76 MMHG | BODY MASS INDEX: 20.99 KG/M2 | SYSTOLIC BLOOD PRESSURE: 129 MMHG | RESPIRATION RATE: 18 BRPM | OXYGEN SATURATION: 100 % | WEIGHT: 155 LBS | TEMPERATURE: 97 F | HEART RATE: 56 BPM

## 2023-05-31 PROBLEM — I33.0 AORTIC VALVE VEGETATION: Status: ACTIVE | Noted: 2023-05-31

## 2023-05-31 PROBLEM — G00.1 STREPTOCOCCUS PNEUMONIAE MENINGITIS: Status: ACTIVE | Noted: 2023-05-27

## 2023-05-31 PROBLEM — B95.3 BACTEREMIA DUE TO STREPTOCOCCUS PNEUMONIAE: Status: ACTIVE | Noted: 2023-05-29

## 2023-05-31 PROBLEM — A40.3 SEPSIS DUE TO PNEUMOCOCCUS: Status: ACTIVE | Noted: 2023-05-31

## 2023-05-31 LAB
ALBUMIN SERPL BCP-MCNC: 2.2 G/DL (ref 3.5–5.2)
ALP SERPL-CCNC: 126 U/L (ref 55–135)
ALT SERPL W/O P-5'-P-CCNC: 223 U/L (ref 10–44)
AMPHET+METHAMPHET UR QL: NEGATIVE
ANION GAP SERPL CALC-SCNC: 9 MMOL/L (ref 8–16)
AST SERPL-CCNC: 60 U/L (ref 10–40)
BARBITURATES UR QL SCN>200 NG/ML: NEGATIVE
BASOPHILS # BLD AUTO: 0.03 K/UL (ref 0–0.2)
BASOPHILS NFR BLD: 0.2 % (ref 0–1.9)
BENZODIAZ UR QL SCN>200 NG/ML: NEGATIVE
BILIRUB SERPL-MCNC: 0.3 MG/DL (ref 0.1–1)
BUN SERPL-MCNC: 15 MG/DL (ref 6–20)
BZE UR QL SCN: NEGATIVE
CALCIUM SERPL-MCNC: 9 MG/DL (ref 8.7–10.5)
CANNABINOIDS UR QL SCN: ABNORMAL
CHLORIDE SERPL-SCNC: 106 MMOL/L (ref 95–110)
CO2 SERPL-SCNC: 25 MMOL/L (ref 23–29)
CREAT SERPL-MCNC: 0.7 MG/DL (ref 0.5–1.4)
CREAT UR-MCNC: 130 MG/DL (ref 23–375)
DIFFERENTIAL METHOD: ABNORMAL
EOSINOPHIL # BLD AUTO: 0 K/UL (ref 0–0.5)
EOSINOPHIL NFR BLD: 0 % (ref 0–8)
ERYTHROCYTE [DISTWIDTH] IN BLOOD BY AUTOMATED COUNT: 14.2 % (ref 11.5–14.5)
EST. GFR  (NO RACE VARIABLE): >60 ML/MIN/1.73 M^2
ETHANOL UR-MCNC: <10 MG/DL
EV RNA SPEC QL NAA+PROBE: NEGATIVE
GLUCOSE SERPL-MCNC: 137 MG/DL (ref 70–110)
HCT VFR BLD AUTO: 32.8 % (ref 40–54)
HGB BLD-MCNC: 10.8 G/DL (ref 14–18)
HSV1, PCR, CSF: NEGATIVE
HSV2, PCR, CSF: NEGATIVE
IMM GRANULOCYTES # BLD AUTO: 0.2 K/UL (ref 0–0.04)
IMM GRANULOCYTES NFR BLD AUTO: 1.3 % (ref 0–0.5)
LYMPHOCYTES # BLD AUTO: 0.9 K/UL (ref 1–4.8)
LYMPHOCYTES NFR BLD: 5.8 % (ref 18–48)
MAGNESIUM SERPL-MCNC: 2 MG/DL (ref 1.6–2.6)
MCH RBC QN AUTO: 23.4 PG (ref 27–31)
MCHC RBC AUTO-ENTMCNC: 32.9 G/DL (ref 32–36)
MCV RBC AUTO: 71 FL (ref 82–98)
METHADONE UR QL SCN>300 NG/ML: NEGATIVE
MONOCYTES # BLD AUTO: 0.9 K/UL (ref 0.3–1)
MONOCYTES NFR BLD: 6 % (ref 4–15)
NEUTROPHILS # BLD AUTO: 13.5 K/UL (ref 1.8–7.7)
NEUTROPHILS NFR BLD: 86.7 % (ref 38–73)
NRBC BLD-RTO: 0 /100 WBC
OPIATES UR QL SCN: NEGATIVE
PCP UR QL SCN>25 NG/ML: NEGATIVE
PHOSPHATE SERPL-MCNC: 3.4 MG/DL (ref 2.7–4.5)
PLATELET # BLD AUTO: 456 K/UL (ref 150–450)
PMV BLD AUTO: 11.6 FL (ref 9.2–12.9)
POTASSIUM SERPL-SCNC: 4.4 MMOL/L (ref 3.5–5.1)
PROT SERPL-MCNC: 6.3 G/DL (ref 6–8.4)
RBC # BLD AUTO: 4.61 M/UL (ref 4.6–6.2)
RPR SER QL: NORMAL
SODIUM SERPL-SCNC: 140 MMOL/L (ref 136–145)
SPECIMEN SOURCE: NORMAL
TOXICOLOGY INFORMATION: ABNORMAL
WBC # BLD AUTO: 15.58 K/UL (ref 3.9–12.7)

## 2023-05-31 PROCEDURE — 99239 HOSP IP/OBS DSCHRG MGMT >30: CPT | Mod: ,,, | Performed by: STUDENT IN AN ORGANIZED HEALTH CARE EDUCATION/TRAINING PROGRAM

## 2023-05-31 PROCEDURE — 99239 PR HOSPITAL DISCHARGE DAY,>30 MIN: ICD-10-PCS | Mod: ,,, | Performed by: STUDENT IN AN ORGANIZED HEALTH CARE EDUCATION/TRAINING PROGRAM

## 2023-05-31 PROCEDURE — 83735 ASSAY OF MAGNESIUM: CPT | Performed by: STUDENT IN AN ORGANIZED HEALTH CARE EDUCATION/TRAINING PROGRAM

## 2023-05-31 PROCEDURE — 25000003 PHARM REV CODE 250: Performed by: STUDENT IN AN ORGANIZED HEALTH CARE EDUCATION/TRAINING PROGRAM

## 2023-05-31 PROCEDURE — 85025 COMPLETE CBC W/AUTO DIFF WBC: CPT | Performed by: STUDENT IN AN ORGANIZED HEALTH CARE EDUCATION/TRAINING PROGRAM

## 2023-05-31 PROCEDURE — 99233 PR SUBSEQUENT HOSPITAL CARE,LEVL III: ICD-10-PCS | Mod: ,,, | Performed by: STUDENT IN AN ORGANIZED HEALTH CARE EDUCATION/TRAINING PROGRAM

## 2023-05-31 PROCEDURE — 36415 COLL VENOUS BLD VENIPUNCTURE: CPT | Performed by: STUDENT IN AN ORGANIZED HEALTH CARE EDUCATION/TRAINING PROGRAM

## 2023-05-31 PROCEDURE — 63600175 PHARM REV CODE 636 W HCPCS: Performed by: STUDENT IN AN ORGANIZED HEALTH CARE EDUCATION/TRAINING PROGRAM

## 2023-05-31 PROCEDURE — 63600175 PHARM REV CODE 636 W HCPCS: Performed by: HOSPITALIST

## 2023-05-31 PROCEDURE — 84100 ASSAY OF PHOSPHORUS: CPT | Performed by: STUDENT IN AN ORGANIZED HEALTH CARE EDUCATION/TRAINING PROGRAM

## 2023-05-31 PROCEDURE — 25000003 PHARM REV CODE 250: Performed by: HOSPITALIST

## 2023-05-31 PROCEDURE — 86592 SYPHILIS TEST NON-TREP QUAL: CPT | Performed by: STUDENT IN AN ORGANIZED HEALTH CARE EDUCATION/TRAINING PROGRAM

## 2023-05-31 PROCEDURE — 99233 SBSQ HOSP IP/OBS HIGH 50: CPT | Mod: ,,, | Performed by: STUDENT IN AN ORGANIZED HEALTH CARE EDUCATION/TRAINING PROGRAM

## 2023-05-31 PROCEDURE — 80053 COMPREHEN METABOLIC PANEL: CPT | Performed by: STUDENT IN AN ORGANIZED HEALTH CARE EDUCATION/TRAINING PROGRAM

## 2023-05-31 PROCEDURE — 80307 DRUG TEST PRSMV CHEM ANLYZR: CPT | Performed by: STUDENT IN AN ORGANIZED HEALTH CARE EDUCATION/TRAINING PROGRAM

## 2023-05-31 RX ORDER — ACETAMINOPHEN 325 MG/1
650 TABLET ORAL EVERY 4 HOURS PRN
Refills: 0
Start: 2023-05-31

## 2023-05-31 RX ORDER — POLYETHYLENE GLYCOL 3350 17 G/17G
17 POWDER, FOR SOLUTION ORAL DAILY
Status: DISCONTINUED | OUTPATIENT
Start: 2023-05-31 | End: 2023-05-31 | Stop reason: HOSPADM

## 2023-05-31 RX ORDER — HYDROXYZINE HYDROCHLORIDE 25 MG/1
25 TABLET, FILM COATED ORAL 3 TIMES DAILY PRN
Status: DISCONTINUED | OUTPATIENT
Start: 2023-05-31 | End: 2023-05-31 | Stop reason: HOSPADM

## 2023-05-31 RX ORDER — LINEZOLID 600 MG/1
600 TABLET, FILM COATED ORAL EVERY 12 HOURS
Qty: 56 TABLET | Refills: 0 | Status: SHIPPED | OUTPATIENT
Start: 2023-05-31 | End: 2023-06-28

## 2023-05-31 RX ADMIN — METHOCARBAMOL 500 MG: 500 TABLET ORAL at 09:05

## 2023-05-31 RX ADMIN — VANCOMYCIN HYDROCHLORIDE 1250 MG: 1.25 INJECTION, POWDER, LYOPHILIZED, FOR SOLUTION INTRAVENOUS at 05:05

## 2023-05-31 RX ADMIN — POLYETHYLENE GLYCOL 3350 17 G: 17 POWDER, FOR SOLUTION ORAL at 11:05

## 2023-05-31 RX ADMIN — CEFTRIAXONE 2 G: 2 INJECTION, POWDER, FOR SOLUTION INTRAMUSCULAR; INTRAVENOUS at 11:05

## 2023-05-31 RX ADMIN — DEXAMETHASONE SODIUM PHOSPHATE 10 MG: 4 INJECTION INTRA-ARTICULAR; INTRALESIONAL; INTRAMUSCULAR; INTRAVENOUS; SOFT TISSUE at 06:05

## 2023-05-31 RX ADMIN — METHOCARBAMOL 500 MG: 500 TABLET ORAL at 03:05

## 2023-05-31 RX ADMIN — DEXAMETHASONE SODIUM PHOSPHATE 10 MG: 4 INJECTION INTRA-ARTICULAR; INTRALESIONAL; INTRAMUSCULAR; INTRAVENOUS; SOFT TISSUE at 11:05

## 2023-05-31 RX ADMIN — DEXAMETHASONE SODIUM PHOSPHATE 10 MG: 4 INJECTION INTRA-ARTICULAR; INTRALESIONAL; INTRAMUSCULAR; INTRAVENOUS; SOFT TISSUE at 05:05

## 2023-05-31 RX ADMIN — SENNOSIDES 8.6 MG: 8.6 TABLET, FILM COATED ORAL at 09:05

## 2023-05-31 RX ADMIN — DEXAMETHASONE SODIUM PHOSPHATE 10 MG: 4 INJECTION INTRA-ARTICULAR; INTRALESIONAL; INTRAMUSCULAR; INTRAVENOUS; SOFT TISSUE at 12:05

## 2023-05-31 RX ADMIN — CEFTRIAXONE 2 G: 2 INJECTION, POWDER, FOR SOLUTION INTRAMUSCULAR; INTRAVENOUS at 12:05

## 2023-05-31 NOTE — SUBJECTIVE & OBJECTIVE
Interval History: No fevers documented overnight.   Leukocytosis trending down, 15 today.      Review of Systems   Constitutional:  Negative for chills and fever.   Objective:     Vital Signs (Most Recent):  Temp: 97.2 °F (36.2 °C) (05/31/23 0806)  Pulse: 60 (05/31/23 0806)  Resp: 18 (05/31/23 0806)  BP: (!) 159/90 (05/31/23 0806)  SpO2: 95 % (05/31/23 0806) Vital Signs (24h Range):  Temp:  [96.5 °F (35.8 °C)-99.4 °F (37.4 °C)] 97.2 °F (36.2 °C)  Pulse:  [47-60] 60  Resp:  [18-20] 18  SpO2:  [94 %-100 %] 95 %  BP: (118-159)/(73-91) 159/90     Weight: 70.3 kg (155 lb)  Body mass index is 21.02 kg/m².    Estimated Creatinine Clearance: 135.3 mL/min (based on SCr of 0.7 mg/dL).     Physical Exam  Constitutional:       General: He is not in acute distress.     Appearance: He is not toxic-appearing.   HENT:      Head: Normocephalic and atraumatic.      Right Ear: External ear normal.      Left Ear: External ear normal.      Mouth/Throat:      Mouth: Mucous membranes are moist.   Eyes:      General: No scleral icterus.        Right eye: No discharge.         Left eye: No discharge.   Pulmonary:      Effort: Pulmonary effort is normal. No respiratory distress.   Abdominal:      General: There is no distension.      Palpations: Abdomen is soft.      Tenderness: There is no abdominal tenderness. There is no guarding.   Musculoskeletal:      Cervical back: No rigidity or tenderness.      Right lower leg: No edema.      Left lower leg: No edema.   Skin:     General: Skin is warm and dry.   Neurological:      Mental Status: He is alert.        Significant Labs:   Microbiology Results (last 7 days)       Procedure Component Value Units Date/Time    CSF culture [[id number]]  (Abnormal)  (Susceptibility) Collected: 05/28/23 0008    Order Status: Completed Specimen: CSF (Spinal Fluid) Updated: 05/31/23 0800     CSF CULTURE STREPTOCOCCUS PNEUMONIAE     Gram Stain Result Cytospin indicates:      Many WBC's      Many Gram positive  cocci      Results called to and read back by:Steven Cole 05/28/2023  09:33    Narrative:      ADD ON CSF CULTURE PER DR JÚNIOR SILVER/ORDER# [id number] @ 1:22AM    Blood culture [[id number]] Collected: 05/28/23 1326    Order Status: Completed Specimen: Blood Updated: 05/30/23 1812     Blood Culture, Routine No Growth to date      No Growth to date      No Growth to date    Blood culture [[id number]] Collected: 05/28/23 1326    Order Status: Completed Specimen: Blood Updated: 05/30/23 1812     Blood Culture, Routine No Growth to date      No Growth to date      No Growth to date    Blood culture x two cultures. Draw prior to antibiotics. [[id number]]  (Abnormal) Collected: 05/27/23 2052    Order Status: Completed Specimen: Blood from Peripheral, Forearm, Left Updated: 05/30/23 1037     Blood Culture, Routine Gram stain aer bottle: Gram positive cocci in chains resembling Strep      Positive results previously called 05/28/2023  08:53      Gram stain daria bottle: Gram positive cocci in chains resembling Strep      Positive results previously called 05/28/2023  11:07      STREPTOCOCCUS PNEUMONIAE  For susceptibility see order #[id number]      Narrative:      Aerobic and anaerobic    Blood culture x two cultures. Draw prior to antibiotics. [[id number]]  (Abnormal)  (Susceptibility) Collected: 05/27/23 1917    Order Status: Completed Specimen: Blood from Peripheral, Antecubital, Left Updated: 05/30/23 1036     Blood Culture, Routine Gram stain aer bottle: Gram positive cocci in chains resembling Strep      Results called to and read back by:Roz Chapman RN 05/28/2023  04:56      Gram stain daria bottle: Gram positive cocci in chains resembling Strep      Positive results previously called 05/28/2023  06:32      STREPTOCOCCUS PNEUMONIAE    Narrative:      Aerobic and anaerobic    Rapid Organism ID by PCR (from Blood culture) [[id number]]  (Abnormal) Collected: 05/27/23 1917    Order Status: Completed Updated: 05/28/23  0623     Enterococcus faecalis Not Detected     Enterococcus faecium Not Detected     Listeria Monocytogenes Not Detected     Staphylococcus spp. Not Detected     Staphylococcus aureus Not Detected     Staphylococcus epidermidis Not Detected     Staphylococcus lugdunensis Not Detected     Streptococcus species See species for ID     Streptococcus agalactiae Not Detected     Streptococcus pneumoniae Detected     Streptococcus pyogenes Not Detected     Acinetobacter calcoaceticus/baumannii complex Not Detected     Bacteroides fragilis Not Detected     Enterobacerales Not Detected     Enterobacter cloacae complex Not Detected     Escherichia Not Detected     Klebsiella aerogenes Not Detected     Klebsiella oxytoca Not Detected     Klebsiella pneumoniae group Not Detected     Proteus Not Detected     Salmonella sp Not Detected     Serratia marcescens Not Detected     Haemophilus influenzae Not Detected     Neisseria meningtidis Not Detected     Pseudomonas aeruginosa Not Detected     Stenotrophomonas maltophilia Not Detected     Candida albicans Not Detected     Candida auris Not Detected     Candida glabrata Not Detected     Candida krusei Not Detected     Candida parapsilosis Not Detected     Candida tropicalis Not Detected     Cryptococcus neoformans/gattii Not Detected     CTX-M (ESBL ) Not Detected     IMP (Carbapenem resistant) Not Detected     KPC resistance gene (Carbapenem resistant) Not Detected     mcr-1  Not Detected     mec A/C  Not Detected     mec A/C and MREJ (MRSA) gene Not Detected     NDM (Carbapenem resistant) Not Detected     OXA-48-like (Carbapenem resistant) Not Detected     van A/B (VRE gene) Not Detected     VIM (Carbapenem resistant) Not Detected    Narrative:      Aerobic and anaerobic    CSF culture and Gram Stain (Tube 2) [[id number]]     Order Status: Completed Specimen: CSF (Spinal Fluid) from CSF Tap, Tube 2     CSF culture and Gram Stain (Tube 2) [[id number]]     Order Status:  Canceled Specimen: CSF (Spinal Fluid) from CSF Tap, Tube 2     Influenza A & B by Molecular [[id number]] Collected: 05/27/23 1905    Order Status: Completed Specimen: Nasopharyngeal Swab Updated: 05/27/23 1938     Influenza A, Molecular Negative     Influenza B, Molecular Negative     Flu A & B Source Nasal swab            Significant Imaging: I have reviewed all pertinent imaging results/findings within the past 24 hours.
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
## 2023-05-31 NOTE — NURSING
Notified primary dr that patient is dressed requesting to go to his car. Explained to him he cannot leave the unit. Charge made aware of patient. He is walking the hallways. I will notify oncoming nurse of patient's request.       Pt left the unit, I notified charge and the physician. I also called security and they found him just as he was exiting the building. They brought him back to the unit. He is not wanting to stay. Night RN is communicating at this point with charge and doctor.

## 2023-05-31 NOTE — PLAN OF CARE
Problem: Adult Inpatient Plan of Care  Goal: Plan of Care Review  Outcome: Ongoing, Progressing     Problem: Constipation  Goal: Effective Bowel Elimination  Outcome: Ongoing, Progressing   POC reviewed with patient. Pt given stool softener to promote bowel elimination. Encourage fluids and ambulation for non medication alternative

## 2023-05-31 NOTE — PROGRESS NOTES
[facility] - Neurosurgery ([facility])  Infectious Disease  Progress Note    Patient Name: Jesus Jean  MRN: [id number]  Admission Date: 5/27/2023  Length of Stay: 4 days  Attending Physician: Nabil Ardon MD  Primary Care Provider: Primary Doctor No    Isolation Status: No active isolations  Assessment/Plan:      ID  * Meningitis  43 yo male with tobacco use admitted with CA invasive pneumococcal meningitis/bacteremia. CT C/A/P without consolidation or acute infectious foci. Repeat blcx ngtd. CSF culture with strep pneumo. TTE with mobile AV echodensity c/f IE. Labs reviewed - leukocytosis improving, WBC 15 today. LFTs elevated, though AST and ALT downtrending. Reviewed strep susceptibilities (ADOLPH PCN <0.03). Pt stated he has never received a pneumococcal vaccine.     Recommendations:  -stop vanco, continue ceftriaxone x4w given echo findings   -monitor for nephrotoxicity/hepatotoxicity with CMP  -continue dexamethasone x 4d   -needs placement for IV abx therapy, not an OPAT candidate due to unstable housing, discussed with pt, he voiced understanding.  -encouraged tobacco use cessation - not interested in patches at this time  -needs updated vaccines as an outpatient    Outpatient Antibiotic Therapy Plan for LTAC:      1) Infection: strep pneumo bacteremia/meningitis/IE    2) Discharge Antibiotics:    Intravenous antibiotics:   Ceftriaxone 2g iv q12hr until 6/10/23 (complete 14d for meningitis) followed by ceftriaxone 2g IV daily until 6/25 (to complete remainder of IE duration)    3) Therapy Duration:  4w    Estimated end date of IV antibiotics: 6/25/23    4) Outpatient Weekly Labs:    Order the following labs to be drawn on Mondays:    CBC   CMP     5) Fax Lab Results to Infectious Diseases Provider: sheila    [facility] Fax Number: [phone]    6) Outpatient Infectious Diseases Follow-up     Follow-up appointment will be arranged by the ID clinic and will be found in the patient's  "appointments tab.     Prior to discharge, please ensure the patient's follow-up has been scheduled.     If there is still no follow-up scheduled prior to discharge, please send an EPIC message to Enedelia Haas in Infectious Diseases.                        Anticipated Disposition: LTAC    Thank you for your consult. I will sign off. Please contact us if you have any additional questions. Above d/w primary team.       Time: 50 minutes   50% of time spent on face-to-face counseling and coordination of care. Counseling included review of test results, diagnosis, and treatment plan with patient and/or family.        Inna Fields MD  Infectious Disease  [facility] - Neurosurgery ([facility])    Subjective:     Principal Problem:Meningitis    HPI:    43 yo man, s/p a trip to [facility], developed fever, headache, and stiff neck. No know ill contacts. Had URI symptoms prior. Presented to the ED with generalized body aches, chills, nausea, vomiting.  He reports that "everything hurts." Denies any vision changes numbness or weakness in his arms or legs. Underwent CSF analysis c/w pyogenic meningitis. Started on broad abx. ME panel c/w Strep pneumo. Blood and CSF cultures growing the same. ID is consulted for meningitis. Of note, HIV testing is negative.    Interval History: No fevers documented overnight.   Leukocytosis trending down, 15 today. Tolerating abx without issues.      Review of Systems   Constitutional:  Negative for chills and fever.   Objective:     Vital Signs (Most Recent):  Temp: 97.2 °F (36.2 °C) (05/31/23 0806)  Pulse: 60 (05/31/23 0806)  Resp: 18 (05/31/23 0806)  BP: (!) 159/90 (05/31/23 0806)  SpO2: 95 % (05/31/23 0806) Vital Signs (24h Range):  Temp:  [96.5 °F (35.8 °C)-99.4 °F (37.4 °C)] 97.2 °F (36.2 °C)  Pulse:  [47-60] 60  Resp:  [18-20] 18  SpO2:  [94 %-100 %] 95 %  BP: (118-159)/(73-91) 159/90     Weight: 70.3 kg (155 lb)  Body mass index is 21.02 kg/m².    Estimated Creatinine " Clearance: 135.3 mL/min (based on SCr of 0.7 mg/dL).     Physical Exam  Constitutional:       General: He is not in acute distress.     Appearance: He is not toxic-appearing.   HENT:      Head: Normocephalic and atraumatic.      Right Ear: External ear normal.      Left Ear: External ear normal.      Mouth/Throat:      Mouth: Mucous membranes are moist.   Eyes:      General: No scleral icterus.        Right eye: No discharge.         Left eye: No discharge.   Pulmonary:      Effort: Pulmonary effort is normal. No respiratory distress.   Abdominal:      General: There is no distension.      Palpations: Abdomen is soft.      Tenderness: There is no abdominal tenderness. There is no guarding.   Musculoskeletal:      Cervical back: No rigidity or tenderness.      Right lower leg: No edema.      Left lower leg: No edema.   Skin:     General: Skin is warm and dry.   Neurological:      Mental Status: He is alert.        Significant Labs:   Microbiology Results (last 7 days)       Procedure Component Value Units Date/Time    CSF culture [[id number]]  (Abnormal)  (Susceptibility) Collected: 05/28/23 0008    Order Status: Completed Specimen: CSF (Spinal Fluid) Updated: 05/31/23 0800     CSF CULTURE STREPTOCOCCUS PNEUMONIAE     Gram Stain Result Cytospin indicates:      Many WBC's      Many Gram positive cocci      Results called to and read back by:Steven Cole 05/28/2023  09:33    Narrative:      ADD ON CSF CULTURE PER DR JÚNIOR SILVER/ORDER# [id number] @ 1:22AM    Blood culture [[id number]] Collected: 05/28/23 1326    Order Status: Completed Specimen: Blood Updated: 05/30/23 1812     Blood Culture, Routine No Growth to date      No Growth to date      No Growth to date    Blood culture [[id number]] Collected: 05/28/23 1326    Order Status: Completed Specimen: Blood Updated: 05/30/23 1812     Blood Culture, Routine No Growth to date      No Growth to date      No Growth to date    Blood culture x two cultures. Draw prior  to antibiotics. [[id number]]  (Abnormal) Collected: 05/27/23 2052    Order Status: Completed Specimen: Blood from Peripheral, Forearm, Left Updated: 05/30/23 1037     Blood Culture, Routine Gram stain aer bottle: Gram positive cocci in chains resembling Strep      Positive results previously called 05/28/2023  08:53      Gram stain daria bottle: Gram positive cocci in chains resembling Strep      Positive results previously called 05/28/2023  11:07      STREPTOCOCCUS PNEUMONIAE  For susceptibility see order #[id number]      Narrative:      Aerobic and anaerobic    Blood culture x two cultures. Draw prior to antibiotics. [[id number]]  (Abnormal)  (Susceptibility) Collected: 05/27/23 1917    Order Status: Completed Specimen: Blood from Peripheral, Antecubital, Left Updated: 05/30/23 1036     Blood Culture, Routine Gram stain aer bottle: Gram positive cocci in chains resembling Strep      Results called to and read back by:Roz Chapman RN 05/28/2023  04:56      Gram stain daria bottle: Gram positive cocci in chains resembling Strep      Positive results previously called 05/28/2023  06:32      STREPTOCOCCUS PNEUMONIAE    Narrative:      Aerobic and anaerobic    Rapid Organism ID by PCR (from Blood culture) [[id number]]  (Abnormal) Collected: 05/27/23 1917    Order Status: Completed Updated: 05/28/23 0623     Enterococcus faecalis Not Detected     Enterococcus faecium Not Detected     Listeria Monocytogenes Not Detected     Staphylococcus spp. Not Detected     Staphylococcus aureus Not Detected     Staphylococcus epidermidis Not Detected     Staphylococcus lugdunensis Not Detected     Streptococcus species See species for ID     Streptococcus agalactiae Not Detected     Streptococcus pneumoniae Detected     Streptococcus pyogenes Not Detected     Acinetobacter calcoaceticus/baumannii complex Not Detected     Bacteroides fragilis Not Detected     Enterobacerales Not Detected     Enterobacter cloacae complex Not Detected      Escherichia Not Detected     Klebsiella aerogenes Not Detected     Klebsiella oxytoca Not Detected     Klebsiella pneumoniae group Not Detected     Proteus Not Detected     Salmonella sp Not Detected     Serratia marcescens Not Detected     Haemophilus influenzae Not Detected     Neisseria meningtidis Not Detected     Pseudomonas aeruginosa Not Detected     Stenotrophomonas maltophilia Not Detected     Candida albicans Not Detected     Candida auris Not Detected     Candida glabrata Not Detected     Candida krusei Not Detected     Candida parapsilosis Not Detected     Candida tropicalis Not Detected     Cryptococcus neoformans/gattii Not Detected     CTX-M (ESBL ) Not Detected     IMP (Carbapenem resistant) Not Detected     KPC resistance gene (Carbapenem resistant) Not Detected     mcr-1  Not Detected     mec A/C  Not Detected     mec A/C and MREJ (MRSA) gene Not Detected     NDM (Carbapenem resistant) Not Detected     OXA-48-like (Carbapenem resistant) Not Detected     van A/B (VRE gene) Not Detected     VIM (Carbapenem resistant) Not Detected    Narrative:      Aerobic and anaerobic    CSF culture and Gram Stain (Tube 2) [[id number]]     Order Status: Completed Specimen: CSF (Spinal Fluid) from CSF Tap, Tube 2     CSF culture and Gram Stain (Tube 2) [[id number]]     Order Status: Canceled Specimen: CSF (Spinal Fluid) from CSF Tap, Tube 2     Influenza A & B by Molecular [[id number]] Collected: 05/27/23 1905    Order Status: Completed Specimen: Nasopharyngeal Swab Updated: 05/27/23 1938     Influenza A, Molecular Negative     Influenza B, Molecular Negative     Flu A & B Source Nasal swab            Significant Imaging: I have reviewed all pertinent imaging results/findings within the past 24 hours.

## 2023-05-31 NOTE — SUBJECTIVE & OBJECTIVE
Interval History:   No events overnight. Patient with improved appetite and headache. Notes constipation. CSF cultures finalized. Awaiting ID recs.       Review of Systems   Constitutional:  Positive for appetite change and fatigue. Negative for activity change and chills.   HENT:  Negative for sore throat and trouble swallowing.    Eyes:  Negative for photophobia and discharge.   Respiratory:  Negative for wheezing.    Cardiovascular:  Negative for chest pain and leg swelling.   Gastrointestinal:  Positive for constipation. Negative for abdominal distention, abdominal pain, diarrhea and nausea.   Endocrine: Negative for cold intolerance and heat intolerance.   Genitourinary:  Negative for dysuria.   Musculoskeletal:  Negative for arthralgias, back pain and myalgias.   Skin:  Negative for rash.   Neurological:  Positive for weakness and headaches.   Psychiatric/Behavioral:  Negative for agitation, behavioral problems and confusion.    Objective:     Vital Signs (Most Recent):  Temp: 97.2 °F (36.2 °C) (05/31/23 0806)  Pulse: (!) 46 (05/31/23 1110)  Resp: 18 (05/31/23 0806)  BP: (!) 159/90 (05/31/23 0806)  SpO2: 95 % (05/31/23 0806) Vital Signs (24h Range):  Temp:  [96.5 °F (35.8 °C)-99.4 °F (37.4 °C)] 97.2 °F (36.2 °C)  Pulse:  [46-60] 46  Resp:  [18-20] 18  SpO2:  [94 %-100 %] 95 %  BP: (118-159)/(73-91) 159/90     Weight: 70.3 kg (155 lb)  Body mass index is 21.02 kg/m².    Intake/Output Summary (Last 24 hours) at 5/31/2023 1244  Last data filed at 5/31/2023 1135  Gross per 24 hour   Intake 1342.52 ml   Output 3065 ml   Net -1722.48 ml         Physical Exam  Constitutional:       Appearance: Normal appearance. He is normal weight.   HENT:      Head: Normocephalic and atraumatic.      Mouth/Throat:      Mouth: Mucous membranes are moist.   Eyes:      Extraocular Movements: Extraocular movements intact.      Conjunctiva/sclera: Conjunctivae normal.      Pupils: Pupils are equal, round, and reactive to light.    Cardiovascular:      Rate and Rhythm: Normal rate and regular rhythm.      Heart sounds: No murmur heard.  Pulmonary:      Effort: Pulmonary effort is normal. No respiratory distress.      Breath sounds: Normal breath sounds. No wheezing or rales.   Abdominal:      General: Abdomen is flat. There is no distension.      Palpations: Abdomen is soft.      Tenderness: There is no abdominal tenderness. There is no guarding.   Musculoskeletal:         General: No swelling or tenderness.      Right lower leg: No edema.      Left lower leg: No edema.   Skin:     Findings: No rash.   Neurological:      General: No focal deficit present.      Mental Status: He is alert and oriented to person, place, and time. Mental status is at baseline.      Motor: Weakness present.   Psychiatric:         Mood and Affect: Mood normal.         Behavior: Behavior normal.           Significant Labs: All pertinent labs within the past 24 hours have been reviewed.  CBC:   Recent Labs   Lab 05/30/23  0457 05/31/23  0603   WBC 23.67* 15.58*   HGB 10.3* 10.8*   HCT 30.7* 32.8*    456*     CMP:   Recent Labs   Lab 05/30/23  0457 05/31/23  0603    140   K 4.0 4.4    106   CO2 24 25   * 137*   BUN 17 15   CREATININE 0.7 0.7   CALCIUM 9.2 9.0   PROT 6.4 6.3   ALBUMIN 2.2* 2.2*   BILITOT 0.3 0.3   ALKPHOS 151* 126   * 60*   * 223*   ANIONGAP 11 9       Significant Imaging: I have reviewed all pertinent imaging results/findings within the past 24 hours.
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
## 2023-05-31 NOTE — ASSESSMENT & PLAN NOTE
Concern for meningitis after presenting with 3 days increased confusion, HA, N/V, fevers, chills, body aches and generalized weakness     - Admit CTH showed increased ventricular size  - MRI Brain stable with no acute infarct  - s/p LP in ED  - IVF rehydration   - Infectious disease consulted  -- LP studies positive for Strep pneumo, pan sensative   -- Blood culture with Strep penumo, pan sensative   -- Repeat blood culture with no growth   -- Continue CTX   -- Dexamethasone x4 days, sot 5/28  -- TTE on 5/29 with concern for AV vegetation   - PT/OT consulted

## 2023-05-31 NOTE — PROGRESS NOTES
Vancomycin therapy completed/discontinued by provider. Pharmacy will sign-off at this time, please re-consult as needed. Thank you.    Natalio Garcia, PharmD, BCIDP, AAHIVP  Infectious Diseases Clinical Pharmacist  [phone]

## 2023-06-01 PROBLEM — G03.9 MENINGITIS: Status: ACTIVE | Noted: 2023-06-01
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
## 2023-06-01 NOTE — NURSING
Patient alert and oriented x4. Patient educated and aware of consequences of leaving AMA. MD at bedside. AMA papers signed. AVS given.Patient discharged.

## 2023-06-01 NOTE — SIGNIFICANT EVENT
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     Patient reports he was staying in a Motel prior to admission and also had a rental car, rental car in parking lot but he was prevented from going to the parking lot earlier, security was called when he left the floor. Concern patient may have been eloping.     He cites potential outstanding bills for hotel/rental car and collecting his posessions as reason to leave AMA.   HIs plan is to return to the hospital or another hospital to continue treatment after he addresses these issues.  Despite describing adverse effects most severe being death, he did not want to wait till daytime to see if CM or SW could assist.     He requests AMA discharge  I did send secure chat message to Dr. Fields with ID,  Rx for linezolid 600mg PO BID x 28 days ordered for patient as suboptimal treatment measure when pt leaves, sent to [facility] @ [facility].     Nurse Coon present as witness at bedside, AMA forms signed        Jorge Guajardo M.D.  Attending Physician  [facility] Medicine Dept.  Pager: [phone]  [facility] -x [phone]

## 2023-06-01 NOTE — DISCHARGE INSTRUCTIONS
YOU ARE LEAVING AGAINST MEDICAL ADVICE, AS DISCUSSED AT BEDSIDE, INCOMPLETE OR UNTREATED BACTERIAL MENINGITIS, BACTERIAL BLOOD STREAM INFECTION AND POTENTIAL HEART VALVE INFECTION (ENDOCARDITIS) ARE OUR PRIMARY CONCERNS THAT REQUIRE IV ANTIBIOTICS FOR TREATMENT.     RISKS OF LEAVING AGAINST MEDICAL ADVICE INCLUDE - DEATH, SEIZURES, STROKE, SEPTIC SHOCK, KIDNEY FAILURE, LUNG FAILURE OR HEART FAILURE.     I HAVE PRESCRIBED AN ORAL ANTIBIOTIC WHICH IS INCOMPLETE/SUBOPTIMAL TREATMENT, WE RECOMMEND YOU RETURN TO THE HOSPITAL WHEN YOU CAN.

## 2023-06-02 LAB
BACTERIA BLD CULT: NORMAL
BACTERIA BLD CULT: NORMAL

## 2023-06-03 ENCOUNTER — HOSPITAL ENCOUNTER (INPATIENT)
Facility: HOSPITAL | Age: 43
LOS: 3 days | Discharge: LEFT BEFORE DISCHARGE | DRG: 094 | End: 2023-06-06
Attending: EMERGENCY MEDICINE | Admitting: HOSPITALIST
Payer: MEDICAID

## 2023-06-03 DIAGNOSIS — R06.02 SOB (SHORTNESS OF BREATH): ICD-10-CM

## 2023-06-03 DIAGNOSIS — R00.1 BRADYCARDIA: ICD-10-CM

## 2023-06-03 DIAGNOSIS — G00.1 STREPTOCOCCUS PNEUMONIAE MENINGITIS: Primary | ICD-10-CM

## 2023-06-03 DIAGNOSIS — G03.9 MENINGITIS: ICD-10-CM

## 2023-06-03 DIAGNOSIS — R07.9 CHEST PAIN: ICD-10-CM

## 2023-06-03 LAB
ALBUMIN SERPL BCP-MCNC: 2.6 G/DL (ref 3.5–5.2)
ALLENS TEST: NORMAL
ALP SERPL-CCNC: 119 U/L (ref 55–135)
ALT SERPL W/O P-5'-P-CCNC: 111 U/L (ref 10–44)
ANION GAP SERPL CALC-SCNC: 9 MMOL/L (ref 8–16)
AST SERPL-CCNC: 25 U/L (ref 10–40)
BASOPHILS # BLD AUTO: 0.05 K/UL (ref 0–0.2)
BASOPHILS NFR BLD: 0.4 % (ref 0–1.9)
BILIRUB SERPL-MCNC: 0.3 MG/DL (ref 0.1–1)
BUN SERPL-MCNC: 15 MG/DL (ref 6–20)
CALCIUM SERPL-MCNC: 9 MG/DL (ref 8.7–10.5)
CHLORIDE SERPL-SCNC: 101 MMOL/L (ref 95–110)
CO2 SERPL-SCNC: 26 MMOL/L (ref 23–29)
CREAT SERPL-MCNC: 0.8 MG/DL (ref 0.5–1.4)
DIFFERENTIAL METHOD: ABNORMAL
EOSINOPHIL # BLD AUTO: 0.1 K/UL (ref 0–0.5)
EOSINOPHIL NFR BLD: 0.8 % (ref 0–8)
ERYTHROCYTE [DISTWIDTH] IN BLOOD BY AUTOMATED COUNT: 14.1 % (ref 11.5–14.5)
EST. GFR  (NO RACE VARIABLE): >60 ML/MIN/1.73 M^2
GLUCOSE SERPL-MCNC: 119 MG/DL (ref 70–110)
HCT VFR BLD AUTO: 31.8 % (ref 40–54)
HGB BLD-MCNC: 10.6 G/DL (ref 14–18)
IMM GRANULOCYTES # BLD AUTO: 0.52 K/UL (ref 0–0.04)
IMM GRANULOCYTES NFR BLD AUTO: 4.1 % (ref 0–0.5)
LDH SERPL L TO P-CCNC: 1.01 MMOL/L (ref 0.5–2.2)
LYMPHOCYTES # BLD AUTO: 1.9 K/UL (ref 1–4.8)
LYMPHOCYTES NFR BLD: 15 % (ref 18–48)
MCH RBC QN AUTO: 23.8 PG (ref 27–31)
MCHC RBC AUTO-ENTMCNC: 33.3 G/DL (ref 32–36)
MCV RBC AUTO: 72 FL (ref 82–98)
MONOCYTES # BLD AUTO: 1 K/UL (ref 0.3–1)
MONOCYTES NFR BLD: 7.6 % (ref 4–15)
NEUTROPHILS # BLD AUTO: 9.3 K/UL (ref 1.8–7.7)
NEUTROPHILS NFR BLD: 72.1 % (ref 38–73)
NRBC BLD-RTO: 0 /100 WBC
PLATELET # BLD AUTO: 581 K/UL (ref 150–450)
PMV BLD AUTO: 10.1 FL (ref 9.2–12.9)
POTASSIUM SERPL-SCNC: 4.1 MMOL/L (ref 3.5–5.1)
PROT SERPL-MCNC: 6.7 G/DL (ref 6–8.4)
RBC # BLD AUTO: 4.45 M/UL (ref 4.6–6.2)
SAMPLE: NORMAL
SITE: NORMAL
SODIUM SERPL-SCNC: 136 MMOL/L (ref 136–145)
WBC # BLD AUTO: 12.83 K/UL (ref 3.9–12.7)

## 2023-06-03 PROCEDURE — 12000002 HC ACUTE/MED SURGE SEMI-PRIVATE ROOM

## 2023-06-03 PROCEDURE — 82803 BLOOD GASES ANY COMBINATION: CPT

## 2023-06-03 PROCEDURE — 63600175 PHARM REV CODE 636 W HCPCS: Performed by: PHYSICIAN ASSISTANT

## 2023-06-03 PROCEDURE — 99285 EMERGENCY DEPT VISIT HI MDM: CPT | Mod: ,,, | Performed by: PHYSICIAN ASSISTANT

## 2023-06-03 PROCEDURE — 99285 PR EMERGENCY DEPT VISIT,LEVEL V: ICD-10-PCS | Mod: ,,, | Performed by: PHYSICIAN ASSISTANT

## 2023-06-03 PROCEDURE — 85025 COMPLETE CBC W/AUTO DIFF WBC: CPT | Performed by: PHYSICIAN ASSISTANT

## 2023-06-03 PROCEDURE — 96365 THER/PROPH/DIAG IV INF INIT: CPT

## 2023-06-03 PROCEDURE — 93010 EKG 12-LEAD: ICD-10-PCS | Mod: ,,, | Performed by: INTERNAL MEDICINE

## 2023-06-03 PROCEDURE — 93010 ELECTROCARDIOGRAM REPORT: CPT | Mod: ,,, | Performed by: INTERNAL MEDICINE

## 2023-06-03 PROCEDURE — 99900035 HC TECH TIME PER 15 MIN (STAT)

## 2023-06-03 PROCEDURE — 80053 COMPREHEN METABOLIC PANEL: CPT | Performed by: PHYSICIAN ASSISTANT

## 2023-06-03 PROCEDURE — 83605 ASSAY OF LACTIC ACID: CPT

## 2023-06-03 PROCEDURE — 93005 ELECTROCARDIOGRAM TRACING: CPT

## 2023-06-03 PROCEDURE — 87040 BLOOD CULTURE FOR BACTERIA: CPT | Mod: 59 | Performed by: PHYSICIAN ASSISTANT

## 2023-06-03 PROCEDURE — 83605 ASSAY OF LACTIC ACID: CPT | Performed by: STUDENT IN AN ORGANIZED HEALTH CARE EDUCATION/TRAINING PROGRAM

## 2023-06-03 PROCEDURE — 99285 EMERGENCY DEPT VISIT HI MDM: CPT | Mod: 25

## 2023-06-03 PROCEDURE — 25000003 PHARM REV CODE 250: Performed by: PHYSICIAN ASSISTANT

## 2023-06-03 RX ORDER — ACETAMINOPHEN 500 MG
1000 TABLET ORAL
Status: COMPLETED | OUTPATIENT
Start: 2023-06-03 | End: 2023-06-03

## 2023-06-03 RX ORDER — IBUPROFEN 200 MG
16 TABLET ORAL
Status: DISCONTINUED | OUTPATIENT
Start: 2023-06-04 | End: 2023-06-06

## 2023-06-03 RX ORDER — SODIUM CHLORIDE 0.9 % (FLUSH) 0.9 %
10 SYRINGE (ML) INJECTION EVERY 12 HOURS PRN
Status: DISCONTINUED | OUTPATIENT
Start: 2023-06-04 | End: 2023-06-07 | Stop reason: HOSPADM

## 2023-06-03 RX ORDER — IBUPROFEN 200 MG
24 TABLET ORAL
Status: DISCONTINUED | OUTPATIENT
Start: 2023-06-04 | End: 2023-06-06

## 2023-06-03 RX ORDER — NALOXONE HCL 0.4 MG/ML
0.02 VIAL (ML) INJECTION
Status: DISCONTINUED | OUTPATIENT
Start: 2023-06-04 | End: 2023-06-07 | Stop reason: HOSPADM

## 2023-06-03 RX ORDER — LANOLIN ALCOHOL/MO/W.PET/CERES
1 CREAM (GRAM) TOPICAL EVERY OTHER DAY
Status: DISCONTINUED | OUTPATIENT
Start: 2023-06-04 | End: 2023-06-07 | Stop reason: HOSPADM

## 2023-06-03 RX ORDER — GLUCAGON 1 MG
1 KIT INJECTION
Status: DISCONTINUED | OUTPATIENT
Start: 2023-06-04 | End: 2023-06-07 | Stop reason: HOSPADM

## 2023-06-03 RX ORDER — ACETAMINOPHEN 500 MG
1000 TABLET ORAL EVERY 8 HOURS PRN
Status: DISCONTINUED | OUTPATIENT
Start: 2023-06-04 | End: 2023-06-07 | Stop reason: HOSPADM

## 2023-06-03 RX ADMIN — CEFTRIAXONE 2 G: 2 INJECTION, POWDER, FOR SOLUTION INTRAMUSCULAR; INTRAVENOUS at 11:06

## 2023-06-03 RX ADMIN — SODIUM CHLORIDE, POTASSIUM CHLORIDE, SODIUM LACTATE AND CALCIUM CHLORIDE 2109 ML: 600; 310; 30; 20 INJECTION, SOLUTION INTRAVENOUS at 11:06

## 2023-06-03 RX ADMIN — ACETAMINOPHEN 1000 MG: 500 TABLET ORAL at 11:06

## 2023-06-04 PROBLEM — Z75.8 DISCHARGE PLANNING ISSUES: Status: ACTIVE | Noted: 2023-06-04

## 2023-06-04 LAB
ALBUMIN SERPL BCP-MCNC: 2.2 G/DL (ref 3.5–5.2)
ALP SERPL-CCNC: 100 U/L (ref 55–135)
ALT SERPL W/O P-5'-P-CCNC: 88 U/L (ref 10–44)
AMPHET+METHAMPHET UR QL: NEGATIVE
ANION GAP SERPL CALC-SCNC: 8 MMOL/L (ref 8–16)
AST SERPL-CCNC: 21 U/L (ref 10–40)
BARBITURATES UR QL SCN>200 NG/ML: NEGATIVE
BENZODIAZ UR QL SCN>200 NG/ML: NEGATIVE
BILIRUB SERPL-MCNC: 0.3 MG/DL (ref 0.1–1)
BILIRUB UR QL STRIP: NEGATIVE
BUN SERPL-MCNC: 14 MG/DL (ref 6–20)
BZE UR QL SCN: NEGATIVE
CALCIUM SERPL-MCNC: 8.5 MG/DL (ref 8.7–10.5)
CANNABINOIDS UR QL SCN: ABNORMAL
CHLORIDE SERPL-SCNC: 105 MMOL/L (ref 95–110)
CLARITY UR REFRACT.AUTO: CLEAR
CO2 SERPL-SCNC: 25 MMOL/L (ref 23–29)
COLOR UR AUTO: YELLOW
CREAT SERPL-MCNC: 0.8 MG/DL (ref 0.5–1.4)
CREAT UR-MCNC: 74 MG/DL (ref 23–375)
ERYTHROCYTE [DISTWIDTH] IN BLOOD BY AUTOMATED COUNT: 14.2 % (ref 11.5–14.5)
EST. GFR  (NO RACE VARIABLE): >60 ML/MIN/1.73 M^2
ETHANOL UR-MCNC: <10 MG/DL
GLUCOSE SERPL-MCNC: 102 MG/DL (ref 70–110)
GLUCOSE UR QL STRIP: NEGATIVE
HCT VFR BLD AUTO: 32.4 % (ref 40–54)
HGB BLD-MCNC: 10.3 G/DL (ref 14–18)
HGB UR QL STRIP: NEGATIVE
HIV 1+2 AB+HIV1 P24 AG SERPL QL IA: NORMAL
KETONES UR QL STRIP: NEGATIVE
LACTATE SERPL-SCNC: 1 MMOL/L (ref 0.5–2.2)
LEUKOCYTE ESTERASE UR QL STRIP: NEGATIVE
MAGNESIUM SERPL-MCNC: 2.1 MG/DL (ref 1.6–2.6)
MCH RBC QN AUTO: 23.4 PG (ref 27–31)
MCHC RBC AUTO-ENTMCNC: 31.8 G/DL (ref 32–36)
MCV RBC AUTO: 74 FL (ref 82–98)
METHADONE UR QL SCN>300 NG/ML: NEGATIVE
NITRITE UR QL STRIP: NEGATIVE
OPIATES UR QL SCN: NEGATIVE
PCP UR QL SCN>25 NG/ML: NEGATIVE
PH UR STRIP: 7 [PH] (ref 5–8)
PHOSPHATE SERPL-MCNC: 3.3 MG/DL (ref 2.7–4.5)
PLATELET # BLD AUTO: 625 K/UL (ref 150–450)
PMV BLD AUTO: 10.7 FL (ref 9.2–12.9)
POTASSIUM SERPL-SCNC: 4.2 MMOL/L (ref 3.5–5.1)
PROT SERPL-MCNC: 5.7 G/DL (ref 6–8.4)
PROT UR QL STRIP: NEGATIVE
RBC # BLD AUTO: 4.41 M/UL (ref 4.6–6.2)
SODIUM SERPL-SCNC: 138 MMOL/L (ref 136–145)
SP GR UR STRIP: 1.01 (ref 1–1.03)
TOXICOLOGY INFORMATION: ABNORMAL
URN SPEC COLLECT METH UR: NORMAL
WBC # BLD AUTO: 10.92 K/UL (ref 3.9–12.7)

## 2023-06-04 PROCEDURE — 80053 COMPREHEN METABOLIC PANEL: CPT | Performed by: STUDENT IN AN ORGANIZED HEALTH CARE EDUCATION/TRAINING PROGRAM

## 2023-06-04 PROCEDURE — 83735 ASSAY OF MAGNESIUM: CPT | Performed by: STUDENT IN AN ORGANIZED HEALTH CARE EDUCATION/TRAINING PROGRAM

## 2023-06-04 PROCEDURE — 99223 PR INITIAL HOSPITAL CARE,LEVL III: ICD-10-PCS | Mod: GC,,, | Performed by: HOSPITALIST

## 2023-06-04 PROCEDURE — 85027 COMPLETE CBC AUTOMATED: CPT | Performed by: STUDENT IN AN ORGANIZED HEALTH CARE EDUCATION/TRAINING PROGRAM

## 2023-06-04 PROCEDURE — 99223 1ST HOSP IP/OBS HIGH 75: CPT | Mod: GC,,, | Performed by: HOSPITALIST

## 2023-06-04 PROCEDURE — 36415 COLL VENOUS BLD VENIPUNCTURE: CPT | Performed by: STUDENT IN AN ORGANIZED HEALTH CARE EDUCATION/TRAINING PROGRAM

## 2023-06-04 PROCEDURE — 63600175 PHARM REV CODE 636 W HCPCS: Performed by: STUDENT IN AN ORGANIZED HEALTH CARE EDUCATION/TRAINING PROGRAM

## 2023-06-04 PROCEDURE — 25000003 PHARM REV CODE 250: Performed by: STUDENT IN AN ORGANIZED HEALTH CARE EDUCATION/TRAINING PROGRAM

## 2023-06-04 PROCEDURE — 81003 URINALYSIS AUTO W/O SCOPE: CPT | Performed by: PHYSICIAN ASSISTANT

## 2023-06-04 PROCEDURE — 20600001 HC STEP DOWN PRIVATE ROOM

## 2023-06-04 PROCEDURE — 84100 ASSAY OF PHOSPHORUS: CPT | Performed by: STUDENT IN AN ORGANIZED HEALTH CARE EDUCATION/TRAINING PROGRAM

## 2023-06-04 PROCEDURE — 87389 HIV-1 AG W/HIV-1&-2 AB AG IA: CPT | Performed by: STUDENT IN AN ORGANIZED HEALTH CARE EDUCATION/TRAINING PROGRAM

## 2023-06-04 PROCEDURE — 99223 PR INITIAL HOSPITAL CARE,LEVL III: ICD-10-PCS | Mod: ,,, | Performed by: STUDENT IN AN ORGANIZED HEALTH CARE EDUCATION/TRAINING PROGRAM

## 2023-06-04 PROCEDURE — 99223 1ST HOSP IP/OBS HIGH 75: CPT | Mod: ,,, | Performed by: STUDENT IN AN ORGANIZED HEALTH CARE EDUCATION/TRAINING PROGRAM

## 2023-06-04 PROCEDURE — 80307 DRUG TEST PRSMV CHEM ANLYZR: CPT | Performed by: STUDENT IN AN ORGANIZED HEALTH CARE EDUCATION/TRAINING PROGRAM

## 2023-06-04 RX ADMIN — CEFTRIAXONE 2 G: 2 INJECTION, POWDER, FOR SOLUTION INTRAMUSCULAR; INTRAVENOUS at 08:06

## 2023-06-04 RX ADMIN — DEXAMETHASONE 10 MG: 4 TABLET ORAL at 06:06

## 2023-06-04 RX ADMIN — FERROUS SULFATE TAB 325 MG (65 MG ELEMENTAL FE) 1 EACH: 325 (65 FE) TAB at 08:06

## 2023-06-04 NOTE — NURSING
Patient transfer to ED via stretcher escorted by ED nurse.  Patient stable condition.  Admitted for Meningitis infection.  A&Ox4.  Sinus uriel on monitor. Vital signs WNL except HR high 40s and low 50s. Pain 5/10 on admission.  Receiving bolus of LR via gravity.  Placed on cardiac monitoring.  Admission assessment and documentation completed. Basic needs met at the time admission.  Call light and bedside table within in reach.  Will continue to monitor for changes of condition.

## 2023-06-04 NOTE — HPI
41 yo male with recent admission for strep pneumo bacteremia/meningitis and AV IE who left [employer] on 5/31, represented for admission. ID consulted for abx recs. Pt stated he felt worse when he left the hospital and came back. He reports he was unable to  linezolid due to cost and slept in his care prior to admission. Reported HA and myalgias PTA, feeling better today.

## 2023-06-04 NOTE — CONSULTS
[facility] - Cardiology Stepdown  Infectious Disease  Consult Note    Patient Name: Jesus Jean  MRN: [id number]  Admission Date: 6/3/2023  Hospital Length of Stay: 1 days  Attending Physician: Ulises Albright MD  Primary Care Provider: Primary Doctor No     Isolation Status: No active isolations    Patient information was obtained from patient, past medical records and ER records.      Inpatient consult to Infectious Diseases  Consult performed by: Inna Fields MD  Consult ordered by: Nain Tay MD        Assessment/Plan:     ID  * Streptococcus pneumoniae meningitis  Recent admission for strep pneumo meningitis (PCN ADOLPH <0.03, CTX <0.25), bacteremia, and native aortic valve IE who left AMA and returned due to worsening symptoms. I independently reviewed pt's lab work and images from this admission: no leukocytosis on labs today, blcx ngtd, and CXR without focal consolidation. Long discussion with patient regarding importance of treatment for his infection with risk of death if he not treated. Pt voiced understanding and is amenable to placement.     Recommendations:  -increase ceftriaxone to 2g iv q12 hr for CNS dosing (changed)  -stop dex, previously treated with dex during last hospital stay  -follow up blcx - so far ngtd  -anticipate 4w course of therapy, rocio 7/2  -recommend / for assistance    Outpatient Antibiotic Therapy Plan for placement:      1) Infection: strep bacteremia/meningitis/AVIE    2) Discharge Antibiotics:    Intravenous antibiotics:   Ceftriaxone 2g iv q12 hr x14 d (rocio 6/18) followed by ceftriaxone 2g iv q24h until 7/2      3) Therapy Duration:  4w    Estimated end date of IV antibiotics: 7/2/23    4) Outpatient Weekly Labs:    Order the following labs to be drawn on Mondays:    CBC   CMP       5) Fax Lab Results to Infectious Diseases Provider: sheila or LTAC ID provider    [facility] Fax Number: [phone]    6) Outpatient Infectious Diseases  Follow-up     Follow-up appointment will be arranged by the ID clinic and will be found in the patient's appointments tab.     Prior to discharge, please ensure the patient's follow-up has been scheduled.     If there is still no follow-up scheduled prior to discharge, please send an EPIC message to Enedelia Haas in Infectious Diseases.                  Thank you for your consult. I will sign off. Please contact us if you have any additional questions. Above d/w primary team. Please reconsult if cx return positive or with questions/concerns.     Inna Fields MD  Infectious Disease  [facility] - Cardiology Stepdown    Subjective:     Principal Problem: Streptococcus pneumoniae meningitis    HPI: 43 yo male with recent admission for strep pneumo bacteremia/meningitis and AV IE who left [facility] on 5/31, represented for admission. ID consulted for abx recs. Pt stated he felt worse when he left the hospital and came back. He reports he was unable to  linezolid due to cost and slept in his care prior to admission. Reported HA and myalgias PTA, feeling better today.       History reviewed. No pertinent past medical history.    History reviewed. No pertinent surgical history.    Review of patient's allergies indicates:   Allergen Reactions    Sulfa (sulfonamide antibiotics) Other (See Comments)     Unknown per pt       Medications:  Medications Prior to Admission   Medication Sig    acetaminophen (TYLENOL) 325 MG tablet Take 2 tablets (650 mg total) by mouth every 4 (four) hours as needed for Pain (headaches).    linezolid (ZYVOX) 600 mg Tab Take 1 tablet (600 mg total) by mouth every 12 (twelve) hours.     Antibiotics (From admission, onward)      Start     Stop Route Frequency Ordered    06/04/23 0045  cefTRIAXone (ROCEPHIN) 1 g in dextrose 5 % in water (D5W) 5 % 50 mL IVPB (MB+)         -- IV Every 12 hours (non-standard times) 06/03/23 8020          Antifungals (From admission, onward)      None           Antivirals (From admission, onward)      None               There is no immunization history on file for this patient.    Family History    None       Social History     Socioeconomic History    Marital status: Single   Tobacco Use    Smoking status: Every Day     Packs/day: 0.50     Types: Cigarettes    Smokeless tobacco: Never   Substance and Sexual Activity    Alcohol use: Yes    Drug use: Yes     Types: Marijuana     Review of Systems   Constitutional:  Negative for chills and fever.   Gastrointestinal:  Negative for abdominal pain, nausea and vomiting.   Musculoskeletal:  Positive for myalgias.   Skin:  Negative for rash and wound.   Neurological:  Positive for headaches.   Objective:     Vital Signs (Most Recent):  Temp: 98.8 °F (37.1 °C) (06/04/23 0536)  Pulse: (!) 59 (06/04/23 0540)  Resp: 18 (06/04/23 0536)  BP: (!) 103/58 (06/04/23 0536)  SpO2: 99 % (06/04/23 0536) Vital Signs (24h Range):  Temp:  [98.1 °F (36.7 °C)-99.1 °F (37.3 °C)] 98.8 °F (37.1 °C)  Pulse:  [50-70] 59  Resp:  [18-20] 18  SpO2:  [97 %-99 %] 99 %  BP: ()/(44-71) 103/58     Weight: 70.3 kg (155 lb)  Body mass index is 21.02 kg/m².    Estimated Creatinine Clearance: 118.4 mL/min (based on SCr of 0.8 mg/dL).     Physical Exam  Constitutional:       General: He is not in acute distress.     Appearance: He is not ill-appearing or toxic-appearing.   HENT:      Head: Normocephalic and atraumatic.   Eyes:      General: No scleral icterus.        Right eye: No discharge.         Left eye: No discharge.   Cardiovascular:      Rate and Rhythm: Normal rate and regular rhythm.   Pulmonary:      Effort: Pulmonary effort is normal. No respiratory distress.      Breath sounds: No wheezing.   Abdominal:      General: There is no distension.      Palpations: Abdomen is soft.      Tenderness: There is no abdominal tenderness.   Musculoskeletal:      Cervical back: No rigidity.      Right lower leg: No edema.      Left lower leg: No edema.    Skin:     General: Skin is warm and dry.      Coloration: Skin is not jaundiced.   Neurological:      Mental Status: He is alert and oriented to person, place, and time.        Significant Labs:   Microbiology Results (last 7 days)       Procedure Component Value Units Date/Time    Blood culture x two cultures. Draw prior to antibiotics. [[id number]] Collected: 06/03/23 2247    Order Status: Completed Specimen: Blood from Peripheral, Forearm, Right Updated: 06/04/23 0715     Blood Culture, Routine No Growth to date    Narrative:      Aerobic and anaerobic    Blood culture x two cultures. Draw prior to antibiotics. [[id number]] Collected: 06/03/23 2247    Order Status: Completed Specimen: Blood from Peripheral, Forearm, Right Updated: 06/04/23 0715     Blood Culture, Routine No Growth to date    Narrative:      Aerobic and anaerobic            Significant Imaging: I have reviewed all pertinent imaging results/findings within the past 24 hours.

## 2023-06-04 NOTE — SUBJECTIVE & OBJECTIVE
History reviewed. No pertinent past medical history.    History reviewed. No pertinent surgical history.    Review of patient's allergies indicates:   Allergen Reactions    Sulfa (sulfonamide antibiotics) Other (See Comments)     Unknown per pt       Medications:  Medications Prior to Admission   Medication Sig    acetaminophen (TYLENOL) 325 MG tablet Take 2 tablets (650 mg total) by mouth every 4 (four) hours as needed for Pain (headaches).    linezolid (ZYVOX) 600 mg Tab Take 1 tablet (600 mg total) by mouth every 12 (twelve) hours.     Antibiotics (From admission, onward)      Start     Stop Route Frequency Ordered    06/04/23 0045  cefTRIAXone (ROCEPHIN) 1 g in dextrose 5 % in water (D5W) 5 % 50 mL IVPB (MB+)         -- IV Every 12 hours (non-standard times) 06/03/23 2339          Antifungals (From admission, onward)      None          Antivirals (From admission, onward)      None               There is no immunization history on file for this patient.    Family History    None       Social History     Socioeconomic History    Marital status: Single   Tobacco Use    Smoking status: Every Day     Packs/day: 0.50     Types: Cigarettes    Smokeless tobacco: Never   Substance and Sexual Activity    Alcohol use: Yes    Drug use: Yes     Types: Marijuana     Review of Systems   Constitutional:  Negative for chills and fever.   Gastrointestinal:  Negative for abdominal pain, nausea and vomiting.   Musculoskeletal:  Positive for myalgias.   Skin:  Negative for rash and wound.   Neurological:  Positive for headaches.   Objective:     Vital Signs (Most Recent):  Temp: 98.8 °F (37.1 °C) (06/04/23 0536)  Pulse: (!) 59 (06/04/23 0540)  Resp: 18 (06/04/23 0536)  BP: (!) 103/58 (06/04/23 0536)  SpO2: 99 % (06/04/23 0536) Vital Signs (24h Range):  Temp:  [98.1 °F (36.7 °C)-99.1 °F (37.3 °C)] 98.8 °F (37.1 °C)  Pulse:  [50-70] 59  Resp:  [18-20] 18  SpO2:  [97 %-99 %] 99 %  BP: ()/(44-71) 103/58     Weight: 70.3 kg (155  lb)  Body mass index is 21.02 kg/m².    Estimated Creatinine Clearance: 118.4 mL/min (based on SCr of 0.8 mg/dL).     Physical Exam  Constitutional:       General: He is not in acute distress.     Appearance: He is not ill-appearing or toxic-appearing.   HENT:      Head: Normocephalic and atraumatic.   Eyes:      General: No scleral icterus.        Right eye: No discharge.         Left eye: No discharge.   Cardiovascular:      Rate and Rhythm: Normal rate and regular rhythm.   Pulmonary:      Effort: Pulmonary effort is normal. No respiratory distress.      Breath sounds: No wheezing.   Abdominal:      General: There is no distension.      Palpations: Abdomen is soft.      Tenderness: There is no abdominal tenderness.   Musculoskeletal:      Cervical back: No rigidity.      Right lower leg: No edema.      Left lower leg: No edema.   Skin:     General: Skin is warm and dry.      Coloration: Skin is not jaundiced.   Neurological:      Mental Status: He is alert and oriented to person, place, and time.        Significant Labs:   Microbiology Results (last 7 days)       Procedure Component Value Units Date/Time    Blood culture x two cultures. Draw prior to antibiotics. [[id number]] Collected: 06/03/23 2247    Order Status: Completed Specimen: Blood from Peripheral, Forearm, Right Updated: 06/04/23 0715     Blood Culture, Routine No Growth to date    Narrative:      Aerobic and anaerobic    Blood culture x two cultures. Draw prior to antibiotics. [[id number]] Collected: 06/03/23 2247    Order Status: Completed Specimen: Blood from Peripheral, Forearm, Right Updated: 06/04/23 0715     Blood Culture, Routine No Growth to date    Narrative:      Aerobic and anaerobic            Significant Imaging: I have reviewed all pertinent imaging results/findings within the past 24 hours.

## 2023-06-04 NOTE — ASSESSMENT & PLAN NOTE
 The left ventricle is normal in size with normal systolic function. The estimated ejection fraction is 65%.   Normal right ventricular size with normal right ventricular systolic function.   Normal left ventricular diastolic function.   The estimated PA systolic pressure is 29 mmHg.   Normal central venous pressure (3 mmHg).   There is a ragged, thin, mobile echodensity that appears to be attached to the ventricular surface of the non-coronary cusp leaflet of the aortic valve. This is concerning for a vegetation given known bacteremia.     EKG Normal Sinus rhythm    -ID Consulted for long term antibiotic management  -Follow up repeat blood cultures  -Follow up Chest Xray to look for any signs of acute volume overload given shortness of breath   -Consider JAIRO for further valve assesment  -Follow up UDS

## 2023-06-04 NOTE — ASSESSMENT & PLAN NOTE
Recent admission for strep pneumo meningitis, bacteremia, and native aortic valve IE who left AMA and returned due to worsening symptoms. I independently reviewed pt's lab work and images from this admission: no leukocytosis on labs today, blcx ngtd, and CXR without focal consolidation. Long discussion with patient regarding importance of treatment for his infection with risk of death if he not treated. Pt voiced understanding and is amenable to placement.     Recommendations:  -increase ceftriaxone to 2g iv q12 hr for CNS dosing (changed)  -stop dex, previously treated with dex during last hospital stay  -follow up blcx  -anticipate 4w course of therapy, rocio 7/2    Outpatient Antibiotic Therapy Plan for placement:      1) Infection: strep bacteremia/meningitis/AVIE    2) Discharge Antibiotics:    Intravenous antibiotics:   Ceftriaxone 2g iv q12 hr x14 d (rocio 6/18) followed by ceftriaxone 2g iv q24h until 7/2      3) Therapy Duration:  4w    Estimated end date of IV antibiotics: 7/2/23    4) Outpatient Weekly Labs:    Order the following labs to be drawn on Mondays:    CBC   CMP       5) Fax Lab Results to Infectious Diseases Provider: sheila or LTAC ID provider    [facility] Fax Number: [phone]    6) Outpatient Infectious Diseases Follow-up     Follow-up appointment will be arranged by the ID clinic and will be found in the patient's appointments tab.     Prior to discharge, please ensure the patient's follow-up has been scheduled.     If there is still no follow-up scheduled prior to discharge, please send an EPIC message to Enedelia Haas in Infectious Diseases.

## 2023-06-04 NOTE — HPI
The patient was a 42 year old male past medical history of aortic valve vegetation and streptococcal pneumonia meningitis  who was recently admitted for the treatment of meningitis bacteremia and endocarditis. At that time he got an LP and it showed strep pneumo which was noted to be pansensitive. At that time a TTE was also performed which showed AV vegetation.     The patient left [facility] on 5/31 with a course of PO linezolid. He was unable to get the antibiotics due to price    The patient represents with symptoms of neck pain, headache, generalized malaise, body aches, chills, shortness of breath.       Labs on admission significant for leukocytosis

## 2023-06-04 NOTE — PLAN OF CARE
[facility] - Cardiology Stepdown  Initial Discharge Assessment       Primary Care Provider: Primary Doctor No    Admission Diagnosis: SOB (shortness of breath) [R06.02]  Streptococcus pneumoniae meningitis [G00.1]  Chest pain [R07.9]    Admission Date: 6/3/2023  Expected Discharge Date:     Transition of Care Barriers: (P) Homeless, Social, Transportation, Unable to afford medication    Payor: MEDICAID / Plan: [employer]UE [place] [place] / Product Type: Managed Medicaid /     Extended Emergency Contact Information  Primary Emergency Contact: Piter Mooreo  Mobile Phone: [phone]  Relation: Significant other    Discharge Plan A: (P) Long-term acute care facility (LTAC)  Discharge Plan B: (P) Home with family      [id number] [place], [place] - [address] AT [facility] [address]  [address]  [facility] [address]  Phone: [phone] Fax: [phone]      Initial Assessment (most recent)       Adult Discharge Assessment - 06/04/23 1828          Discharge Assessment    Assessment Type Discharge Planning Assessment (P)      Confirmed/corrected address, phone number and insurance Yes (P)      Confirmed Demographics Correct on Facesheet (P)      Source of Information patient (P)      Communicated REX with patient/caregiver Date not available/Unable to determine (P)      Reason For Admission Shortness of Breath (P)      People in Home significant other;child(chan), dependent (P)      Do you expect to return to your current living situation? No (P)    Pt's living situation is unstable. [facility] Apartment did not pass inspection.    Do you have help at home or someone to help you manage your care at home? Yes (P)      Who are your caregiver(s) and their phone number(s)? Significant DebraFranky Oscar: [phone] (P)      Prior to hospitilization cognitive status: Alert/Oriented (P)      Current cognitive status: Alert/Oriented (P)      Equipment Currently Used at Home  none (P)      Readmission within 30 days? Yes (P)      Patient currently being followed by outpatient case management? No (P)      Do you currently have service(s) that help you manage your care at home? No (P)      Do you take prescription medications? Yes (P)      Do you have prescription coverage? Yes (P)      Coverage Medicaid Prescription Drug Coverage (P)      Do you have any problems affording any of your prescribed medications? Yes (P)      If yes, what medications? Antibiotics. (P)      Is the patient taking medications as prescribed? no (P)      If no, which medications is patient not taking? Antibiotics. (P)      Who is going to help you get home at discharge? Significant Other, Franky (P)      How do you get to doctors appointments? car, drives self;family or friend will provide (P)      Are you on dialysis? No (P)      Do you take coumadin? No (P)      Discharge Plan A Long-term acute care facility (LTAC) (P)      Discharge Plan B Home with family (P)      DME Needed Upon Discharge  none (P)      Discharge Plan discussed with: Patient (P)      Transition of Care Barriers Homeless;Social;Transportation;Unable to afford medication (P)      SDOH Housing/economic concerns;Lack of primary/family support;Lack of internet access;Lack of phone access (P)      Housing/Economic Concerns Homeless;Low Income;Discord with neighbors, lodgers, landlord;Other (P)    Legal Concerns.       Physical Activity    On average, how many days per week do you engage in moderate to strenuous exercise (like a brisk walk)? 0 days (P)      On average, how many minutes do you engage in exercise at this level? 0 min (P)         Financial Resource Strain    How hard is it for you to pay for the very basics like food, housing, medical care, and heating? Very hard (P)         Housing Stability    In the last 12 months, was there a time when you were not able to pay the mortgage or rent on time? Yes (P)      In the last 12 months, how  many places have you lived? 2 (P)      In the last 12 months, was there a time when you did not have a steady place to sleep or slept in a shelter (including now)? Yes (P)         Transportation Needs    In the past 12 months, has lack of transportation kept you from medical appointments or from getting medications? No (P)      In the past 12 months, has lack of transportation kept you from meetings, work, or from getting things needed for daily living? No (P)         Food Insecurity    Within the past 12 months, you worried that your food would run out before you got the money to buy more. Never true (P)      Within the past 12 months, the food you bought just didn't last and you didn't have money to get more. Never true (P)         Stress    Do you feel stress - tense, restless, nervous, or anxious, or unable to sleep at night because your mind is troubled all the time - these days? Very much (P)         Social Connections    In a typical week, how many times do you talk on the phone with family, friends, or neighbors? More than three times a week (P)      How often do you get together with friends or relatives? More than three times a week (P)      How often do you attend Taoism or Shinto services? Never (P)      Do you belong to any clubs or organizations such as Taoism groups, unions, fraternal or athletic groups, or school groups? No (P)      How often do you attend meetings of the clubs or organizations you belong to? Never (P)      Are you , , , , never , or living with a partner? Never  (P)         Alcohol Use    Q1: How often do you have a drink containing alcohol? Never (P)      Q2: How many drinks containing alcohol do you have on a typical day when you are drinking? Patient does not drink (P)      Q3: How often do you have six or more drinks on one occasion? Never (P)         OTHER    Name(s) of People in Home Significant Other, Franky Moore (P)                        SW met with pt. At bedside to complete Initial Discharge Planning Assessment. SW gathered that pt. May have legal issues as well as problems meeting basic needs such as housing. Pt. States he lives at the listed address with his significant other, Franky Moore and their 2 young daughters. Pt. States the home is undergoing an inspection by the [employer] of [place] ([employer]). States the home did not pass inspections, therefore, the family is dispersed and pt. Is living wherever he can. States he has lived in his car at times. Pt. States he has no phone or access to internet. States his automobile is in disrepair. Pt. Mentioned several times about security teams in [place], which pt. Is very concerned about for his safety. Pt. Mentions his aunt, Rudy Arndt (c) [phone] as a family emergency contact. SW speaking with pt. About the possibility of applying for Social Security Disability if his health condition worsens and pt.cannot work. Pt. could benefit from community resources for housing and medical services. SW to refer pt. to the [facility] program with RN, Aleshia Dc for evaluation.     Dwight Hurtado LMSW

## 2023-06-04 NOTE — ASSESSMENT & PLAN NOTE
The patient continues to have microcytic anemia which appears to be the patients baseline. Iron studies done during the previous admission show Iron Deficiency is likely playing a role.    -PO iron supplementation every other day   -Transfuse to HGB greater than 7

## 2023-06-04 NOTE — H&P
[facility] - Emergency Dept  [facility] Medicine  History & Physical    Patient Name: Jesus Jean  MRN: [id number]  Patient Class: IP- Inpatient  Admission Date: 6/3/2023  Attending Physician: Ulises Albright MD   Primary Care Provider: Primary Doctor No         Patient information was obtained from patient, past medical records and ER records.     Subjective:     Principal Problem:Streptococcus pneumoniae meningitis    Chief Complaint:   Chief Complaint   Patient presents with    Medication Refill     Pt states he was just dx with leukocytosis and was prescribed a medication but he is unable to fill it d/t price.        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 At that time he got an LP and it showed strep pneumo which was noted to be pansensitive. At that time a TTE was also performed which showed AV vegetation.     The patient left AMA on 5/31 with a course of PO linezolid. He was unable to get the antibiotics due to price    The patient represents with symptoms of neck pain, headache, generalized malaise, body aches, chills, shortness of breath.       Labs on admission significant for leukocytosis        No past medical history on file.    No past surgical history on file.    Review of patient's allergies indicates:   Allergen Reactions    Sulfa (sulfonamide antibiotics) Other (See Comments)     Unknown per pt       No current facility-administered medications on file prior to encounter.     Current Outpatient Medications on File Prior to Encounter   Medication Sig    acetaminophen (TYLENOL) 325 MG tablet Take 2 tablets (650 mg total) by mouth every 4 (four) hours as needed for Pain (headaches).    linezolid (ZYVOX) 600 mg Tab Take 1 tablet (600 mg total) by mouth every 12 (twelve) hours.     Family History    None       Tobacco Use     Smoking status: Every Day     Packs/day: 0.50     Types: Cigarettes    Smokeless tobacco: Never   Substance and Sexual Activity    Alcohol use: Yes    Drug use: Yes     Types: Marijuana    Sexual activity: Not on file     Review of Systems   Constitutional:  Positive for activity change and fever.   HENT: Negative.     Respiratory:  Positive for shortness of breath.    Cardiovascular: Negative.    Gastrointestinal: Negative.    Genitourinary:  Negative for dysuria.   Musculoskeletal:  Positive for back pain.   Skin: Negative.    Neurological:  Positive for headaches.   Psychiatric/Behavioral:  Negative for agitation and confusion.    Objective:     Vital Signs (Most Recent):  Temp: 99.1 °F (37.3 °C) (06/03/23 2132)  Pulse: (!) 55 (06/04/23 0016)  Resp: 18 (06/03/23 2356)  BP: 97/67 (06/04/23 0016)  SpO2: 99 % (06/04/23 0016) Vital Signs (24h Range):  Temp:  [99.1 °F (37.3 °C)] 99.1 °F (37.3 °C)  Pulse:  [55-70] 55  Resp:  [18-20] 18  SpO2:  [98 %-99 %] 99 %  BP: ()/(44-67) 97/67     Weight: 70.3 kg (155 lb)  Body mass index is 21.02 kg/m².     Physical Exam  Vitals and nursing note reviewed.   Constitutional:       Appearance: He is ill-appearing.   HENT:      Head: Normocephalic.   Eyes:      Conjunctiva/sclera: Conjunctivae normal.   Cardiovascular:      Rate and Rhythm: Normal rate and regular rhythm.      Pulses: Normal pulses.      Heart sounds: Murmur heard.   Pulmonary:      Effort: Pulmonary effort is normal. No respiratory distress.   Abdominal:      General: Abdomen is flat. There is no distension.   Musculoskeletal:         General: Normal range of motion.   Skin:     General: Skin is warm.   Neurological:      General: No focal deficit present.      Mental Status: He is alert and oriented to person, place, and time.   Psychiatric:         Mood and Affect: Mood normal.         Behavior: Behavior normal.         Thought Content: Thought content normal.              Significant Labs: All  pertinent labs within the past 24 hours have been reviewed.    Significant Imaging: I have reviewed all pertinent imaging results/findings within the past 24 hours.    Assessment/Plan:     * Streptococcus pneumoniae meningitis  The patient presents with the signs and symptoms of his known meningitis infection. Culture data previously showed pansensitive strep pneumo    -Repeat blood cultures  -Restart IV ceftriaxone  -Given volume resuscitation  -Daily labs  -Continue to monitor mental status  -Restart dexamethasone per guidelines 10 mg q4 for 4 days, given prior treatment time defer to ID on cessation      Discharge planning issues  Per patient he is having significant barriers with regards to housing and with regards to affording medications    -Social work consult      Aortic valve vegetation   The left ventricle is normal in size with normal systolic function. The estimated ejection fraction is 65%.   Normal right ventricular size with normal right ventricular systolic function.   Normal left ventricular diastolic function.   The estimated PA systolic pressure is 29 mmHg.   Normal central venous pressure (3 mmHg).   There is a ragged, thin, mobile echodensity that appears to be attached to the ventricular surface of the non-coronary cusp leaflet of the aortic valve. This is concerning for a vegetation given known bacteremia.     EKG Normal Sinus rhythm    -ID Consulted for long term antibiotic management  -Follow up repeat blood cultures  -Follow up Chest Xray to look for any signs of acute volume overload given shortness of breath   -Consider JAIRO for further valve assesment  -Follow up UDS      Microscopic hematuria  Follow up UA to see if he is still having hematuria      Microcytic anemia  The patient continues to have microcytic anemia which appears to be the patients baseline. Iron studies done during the previous admission show Iron Deficiency is likely playing a role.    -PO iron supplementation  every other day   -Transfuse to HGB greater than 7      Elevated LFTs  Continue to trend labs    Improved since last admission        Bacteremia due to Streptococcus pneumoniae  See Meningitis      Abnormal CT of the head  Previous imaging consistent with meningitis        VTE Risk Mitigation (From admission, onward)         Ordered     IP VTE LOW RISK PATIENT  Once         06/03/23 2341     Place sequential compression device  Until discontinued         06/03/23 2341                           Nain Tay MD  Department of Hospital Medicine  [facility] - Emergency Dept

## 2023-06-04 NOTE — PLAN OF CARE
Problem: Adult Inpatient Plan of Care  Goal: Plan of Care Review  Outcome: Ongoing, Progressing  Goal: Patient-Specific Goal (Individualized)  Outcome: Ongoing, Progressing  Goal: Absence of Hospital-Acquired Illness or Injury  Outcome: Ongoing, Progressing  Goal: Optimal Comfort and Wellbeing  Outcome: Ongoing, Progressing  Goal: Readiness for Transition of Care  Outcome: Ongoing, Progressing     Problem: Skin Injury Risk Increased  Goal: Skin Health and Integrity  Outcome: Ongoing, Progressing     Problem: Pain Acute  Goal: Acceptable Pain Control and Functional Ability  Outcome: Ongoing, Progressing     Problem: Infection  Goal: Absence of Infection Signs and Symptoms  Outcome: Ongoing, Progressing     Problem: Fall Injury Risk  Goal: Absence of Fall and Fall-Related Injury  Outcome: Ongoing, Progressing

## 2023-06-04 NOTE — SUBJECTIVE & OBJECTIVE
No past medical history on file.    No past surgical history on file.    Review of patient's allergies indicates:   Allergen Reactions    Sulfa (sulfonamide antibiotics) Other (See Comments)     Unknown per pt       No current facility-administered medications on file prior to encounter.     Current Outpatient Medications on File Prior to Encounter   Medication Sig    acetaminophen (TYLENOL) 325 MG tablet Take 2 tablets (650 mg total) by mouth every 4 (four) hours as needed for Pain (headaches).    linezolid (ZYVOX) 600 mg Tab Take 1 tablet (600 mg total) by mouth every 12 (twelve) hours.     Family History    None       Tobacco Use    Smoking status: Every Day     Packs/day: 0.50     Types: Cigarettes    Smokeless tobacco: Never   Substance and Sexual Activity    Alcohol use: Yes    Drug use: Yes     Types: Marijuana    Sexual activity: Not on file     Review of Systems   Constitutional:  Positive for activity change and fever.   HENT: Negative.     Respiratory:  Positive for shortness of breath.    Cardiovascular: Negative.    Gastrointestinal: Negative.    Genitourinary:  Negative for dysuria.   Musculoskeletal:  Positive for back pain.   Skin: Negative.    Neurological:  Positive for headaches.   Psychiatric/Behavioral:  Negative for agitation and confusion.    Objective:     Vital Signs (Most Recent):  Temp: 99.1 °F (37.3 °C) (06/03/23 2132)  Pulse: (!) 55 (06/04/23 0016)  Resp: 18 (06/03/23 2356)  BP: 97/67 (06/04/23 0016)  SpO2: 99 % (06/04/23 0016) Vital Signs (24h Range):  Temp:  [99.1 °F (37.3 °C)] 99.1 °F (37.3 °C)  Pulse:  [55-70] 55  Resp:  [18-20] 18  SpO2:  [98 %-99 %] 99 %  BP: ()/(44-67) 97/67     Weight: 70.3 kg (155 lb)  Body mass index is 21.02 kg/m².     Physical Exam  Vitals and nursing note reviewed.   Constitutional:       Appearance: He is ill-appearing.   HENT:      Head: Normocephalic.   Eyes:      Conjunctiva/sclera: Conjunctivae normal.   Cardiovascular:      Rate and Rhythm:  Normal rate and regular rhythm.      Pulses: Normal pulses.      Heart sounds: Murmur heard.   Pulmonary:      Effort: Pulmonary effort is normal. No respiratory distress.   Abdominal:      General: Abdomen is flat. There is no distension.   Musculoskeletal:         General: Normal range of motion.   Skin:     General: Skin is warm.   Neurological:      General: No focal deficit present.      Mental Status: He is alert and oriented to person, place, and time.   Psychiatric:         Mood and Affect: Mood normal.         Behavior: Behavior normal.         Thought Content: Thought content normal.              Significant Labs: All pertinent labs within the past 24 hours have been reviewed.    Significant Imaging: I have reviewed all pertinent imaging results/findings within the past 24 hours.

## 2023-06-04 NOTE — ED PROVIDER NOTES
Encounter Date: 6/3/2023       History     Chief Complaint   Patient presents with    Medication Refill     Pt states he was just dx with leukocytosis and was prescribed a medication but he is unable to fill it d/t price.     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  He left AMA on 05/31/2023 without completing treatment for meningitis.  He was discharged with a prescription for linezolid though was unable to pick it up due to the price.  He presents today for worsening of symptoms.  He has associated nausea, body aches, wrist pain, fatigue, Back pain, decreased appetite, shortness of breath.  Denies fever    The history is provided by the patient.   Review of patient's allergies indicates:   Allergen Reactions    Sulfa (sulfonamide antibiotics) Other (See Comments)     Unknown per pt     No past medical history on file.  No past surgical history on file.  No family history on file.  Social History     Tobacco Use    Smoking status: Every Day     Packs/day: 0.50     Types: Cigarettes    Smokeless tobacco: Never   Substance Use Topics    Alcohol use: Yes    Drug use: Yes     Types: Marijuana     Review of Systems   Constitutional:  Positive for chills and fatigue. Negative for fever.   Eyes:  Positive for photophobia. Negative for visual disturbance.   Respiratory:  Positive for shortness of breath.    Cardiovascular:  Negative for chest pain.   Musculoskeletal:  Positive for back pain, myalgias and neck pain.   Neurological:  Positive for dizziness, weakness and headaches.     Physical Exam     Initial Vitals [06/03/23 2132]   BP Pulse Resp Temp SpO2   (!) 102/59 70 20 99.1 °F (37.3 °C) 98 %      MAP       --         Physical Exam    Vitals reviewed.  Constitutional: He appears well-developed and well-nourished. He is not diaphoretic. No distress.   HENT:   Head: Normocephalic and  atraumatic.   Nose: Nose normal.   Eyes: Conjunctivae and EOM are normal.   Neck: Neck supple. Kernig's sign noted. No Brudzinski's sign noted.   Cardiovascular:  Normal rate and regular rhythm.           Pulmonary/Chest: Breath sounds normal. No respiratory distress.   Musculoskeletal:      Cervical back: Neck supple.      Right lower leg: No edema.      Left lower leg: No edema.      Comments: Generalized back and neck tenderness  Left wrist swelling and tenderness     Neurological: He is alert and oriented to person, place, and time. He is not disoriented. GCS eye subscore is 4. GCS verbal subscore is 5. GCS motor subscore is 6.   Skin: No rash noted.   Psychiatric: He has a normal mood and affect. His behavior is normal. Judgment and thought content normal.       ED Course   Procedures  Labs Reviewed   CBC W/ AUTO DIFFERENTIAL - Abnormal; Notable for the following components:       Result Value    WBC 12.83 (*)     RBC 4.45 (*)     Hemoglobin 10.6 (*)     Hematocrit 31.8 (*)     MCV 72 (*)     MCH 23.8 (*)     Platelets 581 (*)     Immature Granulocytes 4.1 (*)     Gran # (ANC) 9.3 (*)     Immature Grans (Abs) 0.52 (*)     Lymph % 15.0 (*)     All other components within normal limits   COMPREHENSIVE METABOLIC PANEL - Abnormal; Notable for the following components:    Glucose 119 (*)     Albumin 2.6 (*)      (*)     All other components within normal limits   CULTURE, BLOOD   CULTURE, BLOOD   LACTIC ACID, PLASMA   URINALYSIS, REFLEX TO URINE CULTURE   TOXICOLOGY SCREEN, URINE, RANDOM (COMPLIANCE)   ISTAT LACTATE          Imaging Results              X-Ray Chest AP Portable (Final result)  Result time 06/04/23 00:14:40      Final result by Robbi Garber MD (06/04/23 00:14:40)                   Impression:      No acute process.      Electronically signed by: Robbi Garber MD  Date:    06/04/2023  Time:    00:14               Narrative:    EXAMINATION:  XR CHEST AP PORTABLE    CLINICAL  HISTORY:  Sepsis;    TECHNIQUE:  Single frontal view of the chest was performed.    COMPARISON:  05/27/2023.    FINDINGS:  The trachea is unremarkable.  The cardiomediastinal silhouette is within normal limits.  The hilar structures are unremarkable.  There is no evidence of free air beneath the hemidiaphragms.  There are no pleural effusions.  There is no evidence a pneumothorax.  There is no evidence of pneumomediastinum.  No airspace opacity is present.  The osseous structures are unremarkable.                                       Medications   cefTRIAXone (ROCEPHIN) 1 g in dextrose 5 % in water (D5W) 5 % 50 mL IVPB (MB+) (has no administration in time range)   sodium chloride 0.9% flush 10 mL (has no administration in time range)   naloxone 0.4 mg/mL injection 0.02 mg (has no administration in time range)   glucose chewable tablet 16 g (has no administration in time range)   glucose chewable tablet 24 g (has no administration in time range)   dextrose 10% bolus 125 mL 125 mL (has no administration in time range)   dextrose 10% bolus 250 mL 250 mL (has no administration in time range)   glucagon (human recombinant) injection 1 mg (has no administration in time range)   acetaminophen tablet 1,000 mg (has no administration in time range)   ferrous sulfate tablet 1 each (has no administration in time range)   dexAMETHasone tablet 10 mg (has no administration in time range)   lactated ringers bolus 2,109 mL (2,109 mLs Intravenous New Bag 6/3/23 2345)   acetaminophen tablet 1,000 mg (1,000 mg Oral Given 6/3/23 2313)   cefTRIAXone (ROCEPHIN) 2 g in dextrose 5 % in water (D5W) 5 % 50 mL IVPB (MB+) (0 g Intravenous Stopped 6/3/23 2344)     Medical Decision Making:   History:   Old Medical Records: I decided to obtain old medical records.  Old Records Summarized: records from previous admission(s).       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 Ill appearing. Blood pressure soft on presentation. Will initate sepsis workup   Differential Diagnosis:   Meningitis, sepsis, encephalitis   Clinical Tests:   Lab Tests: Ordered and Reviewed  Radiological Study: Ordered  Medical Tests: Ordered  ED Management:  - Kernig positive on examination. Sepsis workup initiated. Leukocytosis noted.  Normal point of care lactate.  Started on IV antibiotics and fluid resuscitation  - Will plan to admit to Hospital Medicine for treatment of meningitis  Other:   I discussed test(s) with the performing physician.           ED Course as of 06/04/23 0048   Sat Jun 03, 2023   2322 WBC(!): 12.83 [HM]   2322 POC Lactate: 1.01 [HM]      ED Course User Index  [HM] Charlene Ramírez PA-C                 Clinical Impression:   Final diagnoses:  [R06.02] SOB (shortness of breath)  [G00.1] Streptococcus pneumoniae meningitis (Primary)        ED Disposition Condition    Admit Stable                Charlene Ramírez PA-C  06/04/23 0048

## 2023-06-04 NOTE — ASSESSMENT & PLAN NOTE
The patient presents with the signs and symptoms of his known meningitis infection. Culture data previously showed pansensitive strep pneumo    -Repeat blood cultures  -Restart IV ceftriaxone  -Given volume resuscitation  -Daily labs  -Continue to monitor mental status  -Restart dexamethasone per guidelines 10 mg q4 for 4 days, given prior treatment time defer to ID on cessation

## 2023-06-04 NOTE — PLAN OF CARE
Met with pt. to review discharge recommendation of LTAC and is agreeable to plan    Provided list of facilities in-network with patient's payor plan. Notified that referral sent to below listed facilities from in-network list based on proximity to home/family support:   [facility]  2.   [facility]  3.   [facility]  4.  5. (can send more than 5)    Pt. instructed to identify preference.    Preferred Facility: (if more than 1, listed in order of descending preference)  [facility]    If an additional preferred facility not listed above is identified, additional referral to be sent. If above facilities unable to accept, will send additional referrals to in-network providers.      Dwight Hurtado LMSW

## 2023-06-04 NOTE — PLAN OF CARE
06/04/23 1525   Post-Acute Status   Post-Acute Authorization Placement   Post-Acute Placement Status Referrals Sent   Patient choice form signed by patient/caregiver List from System Post-Acute Care;List with quality metrics by geographic area provided   Discharge Plan   Discharge Plan A Long-term acute care facility (LTAC)   Discharge Plan B Skilled Nursing Facility     MD reaching out to SW via Secure Chat regarding LTAC placement. SW sending LTAC referrals to: [facility], [facility] as chosen by patient for long term abx..     Dwight Hurtado LMSW

## 2023-06-04 NOTE — ASSESSMENT & PLAN NOTE
Per patient he is having significant barriers with regards to housing and with regards to affording medications    -Social work consult

## 2023-06-05 LAB
ALBUMIN SERPL BCP-MCNC: 2.5 G/DL (ref 3.5–5.2)
ALP SERPL-CCNC: 111 U/L (ref 55–135)
ALT SERPL W/O P-5'-P-CCNC: 102 U/L (ref 10–44)
ANION GAP SERPL CALC-SCNC: 9 MMOL/L (ref 8–16)
AST SERPL-CCNC: 37 U/L (ref 10–40)
BACTERIA BLD CULT: ABNORMAL
BACTERIA CSF CULT: ABNORMAL
BILIRUB SERPL-MCNC: 0.2 MG/DL (ref 0.1–1)
BUN SERPL-MCNC: 15 MG/DL (ref 6–20)
CALCIUM SERPL-MCNC: 9.3 MG/DL (ref 8.7–10.5)
CHLORIDE SERPL-SCNC: 104 MMOL/L (ref 95–110)
CO2 SERPL-SCNC: 24 MMOL/L (ref 23–29)
CREAT SERPL-MCNC: 0.7 MG/DL (ref 0.5–1.4)
ERYTHROCYTE [DISTWIDTH] IN BLOOD BY AUTOMATED COUNT: 14.3 % (ref 11.5–14.5)
EST. GFR  (NO RACE VARIABLE): >60 ML/MIN/1.73 M^2
GLUCOSE SERPL-MCNC: 106 MG/DL (ref 70–110)
GRAM STN SPEC: ABNORMAL
HCT VFR BLD AUTO: 32.3 % (ref 40–54)
HGB BLD-MCNC: 10.9 G/DL (ref 14–18)
MAGNESIUM SERPL-MCNC: 2 MG/DL (ref 1.6–2.6)
MCH RBC QN AUTO: 23.6 PG (ref 27–31)
MCHC RBC AUTO-ENTMCNC: 33.7 G/DL (ref 32–36)
MCV RBC AUTO: 70 FL (ref 82–98)
PHOSPHATE SERPL-MCNC: 3.2 MG/DL (ref 2.7–4.5)
PLATELET # BLD AUTO: 610 K/UL (ref 150–450)
PMV BLD AUTO: 9.3 FL (ref 9.2–12.9)
POTASSIUM SERPL-SCNC: 4 MMOL/L (ref 3.5–5.1)
PROT SERPL-MCNC: 6.4 G/DL (ref 6–8.4)
RBC # BLD AUTO: 4.61 M/UL (ref 4.6–6.2)
SODIUM SERPL-SCNC: 137 MMOL/L (ref 136–145)
WBC # BLD AUTO: 14.88 K/UL (ref 3.9–12.7)

## 2023-06-05 PROCEDURE — 93010 EKG 12-LEAD: ICD-10-PCS | Mod: ,,, | Performed by: INTERNAL MEDICINE

## 2023-06-05 PROCEDURE — 93010 ELECTROCARDIOGRAM REPORT: CPT | Mod: ,,, | Performed by: INTERNAL MEDICINE

## 2023-06-05 PROCEDURE — 85027 COMPLETE CBC AUTOMATED: CPT | Performed by: STUDENT IN AN ORGANIZED HEALTH CARE EDUCATION/TRAINING PROGRAM

## 2023-06-05 PROCEDURE — 99232 PR SUBSEQUENT HOSPITAL CARE,LEVL II: ICD-10-PCS | Mod: GC,,, | Performed by: HOSPITALIST

## 2023-06-05 PROCEDURE — 99232 SBSQ HOSP IP/OBS MODERATE 35: CPT | Mod: GC,,, | Performed by: HOSPITALIST

## 2023-06-05 PROCEDURE — 63600175 PHARM REV CODE 636 W HCPCS: Performed by: STUDENT IN AN ORGANIZED HEALTH CARE EDUCATION/TRAINING PROGRAM

## 2023-06-05 PROCEDURE — 94761 N-INVAS EAR/PLS OXIMETRY MLT: CPT

## 2023-06-05 PROCEDURE — 83735 ASSAY OF MAGNESIUM: CPT | Performed by: STUDENT IN AN ORGANIZED HEALTH CARE EDUCATION/TRAINING PROGRAM

## 2023-06-05 PROCEDURE — 25000003 PHARM REV CODE 250: Performed by: STUDENT IN AN ORGANIZED HEALTH CARE EDUCATION/TRAINING PROGRAM

## 2023-06-05 PROCEDURE — 36415 COLL VENOUS BLD VENIPUNCTURE: CPT | Performed by: STUDENT IN AN ORGANIZED HEALTH CARE EDUCATION/TRAINING PROGRAM

## 2023-06-05 PROCEDURE — 20600001 HC STEP DOWN PRIVATE ROOM

## 2023-06-05 PROCEDURE — 80053 COMPREHEN METABOLIC PANEL: CPT | Performed by: STUDENT IN AN ORGANIZED HEALTH CARE EDUCATION/TRAINING PROGRAM

## 2023-06-05 PROCEDURE — 84100 ASSAY OF PHOSPHORUS: CPT | Performed by: STUDENT IN AN ORGANIZED HEALTH CARE EDUCATION/TRAINING PROGRAM

## 2023-06-05 PROCEDURE — 93005 ELECTROCARDIOGRAM TRACING: CPT

## 2023-06-05 RX ADMIN — CEFTRIAXONE 2 G: 2 INJECTION, POWDER, FOR SOLUTION INTRAMUSCULAR; INTRAVENOUS at 08:06

## 2023-06-05 RX ADMIN — ACETAMINOPHEN 1000 MG: 500 TABLET ORAL at 08:06
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
## 2023-06-05 NOTE — ASSESSMENT & PLAN NOTE
Per patient he is having significant barriers with regards to housing and with regards to affording medications, Attempting LTAC placement    -Social work consult

## 2023-06-05 NOTE — ASSESSMENT & PLAN NOTE
The patient presents with the signs and symptoms of his known meningitis infection. Culture data previously showed pansensitive strep pneumo    -Repeat blood cultures, NGTD  -Restart IV ceftriaxone for 4 weeks (finish 7/2), will need picc or midline placed prior to D/C for continued abx, attempting LTAC placement.  -Given volume resuscitation  -Daily labs  -Continue to monitor mental status  -ID did not feel they needed to start steroids since he had finished steroids previously  -

## 2023-06-05 NOTE — PLAN OF CARE
SW sent referrals to six other LTACs in the state. Patient has not been agreeable to LTAC placement for care however, it may be the only option available to patient.     Nay Galvez, [facility]  Case Management Department  [email or web address]       06/05/23 1509   Post-Acute Status   Post-Acute Authorization Placement   Post-Acute Placement Status Referrals Sent   Discharge Delays (!) Patient and Family Barriers  (Patient may not be agreeable to placement for care)   Discharge Plan   Discharge Plan A Long-term acute care facility (LTAC)   Discharge Plan B Shelter

## 2023-06-05 NOTE — ASSESSMENT & PLAN NOTE
 The left ventricle is normal in size with normal systolic function. The estimated ejection fraction is 65%.   Normal right ventricular size with normal right ventricular systolic function.   Normal left ventricular diastolic function.   The estimated PA systolic pressure is 29 mmHg.   Normal central venous pressure (3 mmHg).   There is a ragged, thin, mobile echodensity that appears to be attached to the ventricular surface of the non-coronary cusp leaflet of the aortic valve. This is concerning for a vegetation given known bacteremia.    EKG Normal Sinus rhythm    -ID Consulted for long term antibiotic management  -repeat blood cultures, NGTD  -Chest Xray to look for any signs of acute volume overload given shortness of breath   -Consider JAIRO for further valve assesment  -Follow up UDS  -Pt having some bradycardia, EKG ordered for changes, if changes noted will order TTE

## 2023-06-06 VITALS
DIASTOLIC BLOOD PRESSURE: 60 MMHG | SYSTOLIC BLOOD PRESSURE: 100 MMHG | TEMPERATURE: 98 F | RESPIRATION RATE: 17 BRPM | OXYGEN SATURATION: 97 % | HEART RATE: 65 BPM | HEIGHT: 72 IN | WEIGHT: 152.56 LBS | BODY MASS INDEX: 20.66 KG/M2

## 2023-06-06 LAB
ALBUMIN SERPL BCP-MCNC: 2.6 G/DL (ref 3.5–5.2)
ALP SERPL-CCNC: 108 U/L (ref 55–135)
ALT SERPL W/O P-5'-P-CCNC: 115 U/L (ref 10–44)
ANION GAP SERPL CALC-SCNC: 7 MMOL/L (ref 8–16)
AST SERPL-CCNC: 43 U/L (ref 10–40)
BILIRUB SERPL-MCNC: 0.2 MG/DL (ref 0.1–1)
BUN SERPL-MCNC: 18 MG/DL (ref 6–20)
CALCIUM SERPL-MCNC: 9.5 MG/DL (ref 8.7–10.5)
CHLORIDE SERPL-SCNC: 103 MMOL/L (ref 95–110)
CO2 SERPL-SCNC: 26 MMOL/L (ref 23–29)
CREAT SERPL-MCNC: 0.7 MG/DL (ref 0.5–1.4)
ERYTHROCYTE [DISTWIDTH] IN BLOOD BY AUTOMATED COUNT: 15 % (ref 11.5–14.5)
EST. GFR  (NO RACE VARIABLE): >60 ML/MIN/1.73 M^2
GLUCOSE SERPL-MCNC: 87 MG/DL (ref 70–110)
HCT VFR BLD AUTO: 34.7 % (ref 40–54)
HGB BLD-MCNC: 11.2 G/DL (ref 14–18)
MAGNESIUM SERPL-MCNC: 1.9 MG/DL (ref 1.6–2.6)
MCH RBC QN AUTO: 23.4 PG (ref 27–31)
MCHC RBC AUTO-ENTMCNC: 32.3 G/DL (ref 32–36)
MCV RBC AUTO: 73 FL (ref 82–98)
PHOSPHATE SERPL-MCNC: 3.7 MG/DL (ref 2.7–4.5)
PLATELET # BLD AUTO: 674 K/UL (ref 150–450)
PMV BLD AUTO: 9.4 FL (ref 9.2–12.9)
POTASSIUM SERPL-SCNC: 4.3 MMOL/L (ref 3.5–5.1)
PROT SERPL-MCNC: 6.6 G/DL (ref 6–8.4)
RBC # BLD AUTO: 4.78 M/UL (ref 4.6–6.2)
SODIUM SERPL-SCNC: 136 MMOL/L (ref 136–145)
WBC # BLD AUTO: 8.19 K/UL (ref 3.9–12.7)

## 2023-06-06 PROCEDURE — 99233 PR SUBSEQUENT HOSPITAL CARE,LEVL III: ICD-10-PCS | Mod: GC,,, | Performed by: HOSPITALIST

## 2023-06-06 PROCEDURE — 25000003 PHARM REV CODE 250: Performed by: STUDENT IN AN ORGANIZED HEALTH CARE EDUCATION/TRAINING PROGRAM

## 2023-06-06 PROCEDURE — 25000003 PHARM REV CODE 250

## 2023-06-06 PROCEDURE — 83735 ASSAY OF MAGNESIUM: CPT | Performed by: STUDENT IN AN ORGANIZED HEALTH CARE EDUCATION/TRAINING PROGRAM

## 2023-06-06 PROCEDURE — 99233 SBSQ HOSP IP/OBS HIGH 50: CPT | Mod: GC,,, | Performed by: HOSPITALIST

## 2023-06-06 PROCEDURE — 80053 COMPREHEN METABOLIC PANEL: CPT | Performed by: STUDENT IN AN ORGANIZED HEALTH CARE EDUCATION/TRAINING PROGRAM

## 2023-06-06 PROCEDURE — 63600175 PHARM REV CODE 636 W HCPCS: Performed by: STUDENT IN AN ORGANIZED HEALTH CARE EDUCATION/TRAINING PROGRAM

## 2023-06-06 PROCEDURE — 20600001 HC STEP DOWN PRIVATE ROOM

## 2023-06-06 PROCEDURE — 94761 N-INVAS EAR/PLS OXIMETRY MLT: CPT

## 2023-06-06 PROCEDURE — 84100 ASSAY OF PHOSPHORUS: CPT | Performed by: STUDENT IN AN ORGANIZED HEALTH CARE EDUCATION/TRAINING PROGRAM

## 2023-06-06 PROCEDURE — 36415 COLL VENOUS BLD VENIPUNCTURE: CPT | Performed by: STUDENT IN AN ORGANIZED HEALTH CARE EDUCATION/TRAINING PROGRAM

## 2023-06-06 PROCEDURE — 85027 COMPLETE CBC AUTOMATED: CPT | Performed by: STUDENT IN AN ORGANIZED HEALTH CARE EDUCATION/TRAINING PROGRAM

## 2023-06-06 RX ORDER — TRAZODONE HYDROCHLORIDE 50 MG/1
50 TABLET ORAL NIGHTLY
Status: DISCONTINUED | OUTPATIENT
Start: 2023-06-06 | End: 2023-06-06

## 2023-06-06 RX ORDER — IBUPROFEN 400 MG/1
400 TABLET ORAL ONCE AS NEEDED
Status: COMPLETED | OUTPATIENT
Start: 2023-06-06 | End: 2023-06-06

## 2023-06-06 RX ORDER — TRAZODONE HYDROCHLORIDE 50 MG/1
50 TABLET ORAL NIGHTLY PRN
Status: DISCONTINUED | OUTPATIENT
Start: 2023-06-06 | End: 2023-06-07 | Stop reason: HOSPADM

## 2023-06-06 RX ORDER — DEXTROSE 40 %
15 GEL (GRAM) ORAL
Status: DISCONTINUED | OUTPATIENT
Start: 2023-06-06 | End: 2023-06-07 | Stop reason: HOSPADM

## 2023-06-06 RX ADMIN — FERROUS SULFATE TAB 325 MG (65 MG ELEMENTAL FE) 1 EACH: 325 (65 FE) TAB at 08:06

## 2023-06-06 RX ADMIN — CEFTRIAXONE 2 G: 2 INJECTION, POWDER, FOR SOLUTION INTRAMUSCULAR; INTRAVENOUS at 08:06

## 2023-06-06 RX ADMIN — IBUPROFEN 400 MG: 400 TABLET, FILM COATED ORAL at 09:06

## 2023-06-06 NOTE — PLAN OF CARE
KEVIN spoke to Pt regarding options for his post-acute care. Pt will need IV abx for three to four weeks but is currently without a stable living situation. KEVIN recommended LTAC as best option for his care. Pt is concerned with being home with his S.O. and their new babies. KEVIN informed Pt that she was working on getting him placed at [facility] where his S.O. and babies would be welcome to visit extensively. Pt is mulling it over while awaiting a call from [facility] regarding an assessment that could result in a permanent apartment for his family.    Nay Galvez, [facility]  Case Management Department  [email or web address]
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
## 2023-06-06 NOTE — ASSESSMENT & PLAN NOTE
 The left ventricle is normal in size with normal systolic function. The estimated ejection fraction is 65%.   Normal right ventricular size with normal right ventricular systolic function.   Normal left ventricular diastolic function.   The estimated PA systolic pressure is 29 mmHg.   Normal central venous pressure (3 mmHg).   There is a ragged, thin, mobile echodensity that appears to be attached to the ventricular surface of the non-coronary cusp leaflet of the aortic valve. This is concerning for a vegetation given known bacteremia.    EKG Normal Sinus rhythm    -ID Consulted for long term antibiotic management  -repeat blood cultures, NGTD  -Chest Xray to look for any signs of acute volume overload given shortness of breath   -Consider JAIRO for further valve assesment  -UDS + for marijuana   -Pt having some bradycardia, EKG ordered for changes, if changes noted will order TTE

## 2023-06-06 NOTE — ASSESSMENT & PLAN NOTE
Per patient he is having significant barriers with regards to housing and with regards to affording medications, Attempting LTAC placement    Pt will need a PICC line placed and pending placement at LTAC, patient's living situation should be more stable and home infusion is an option but may not be covered by insurance. Looking LTAC is best option financially if patient is amendable, but patient is concerned about being unable to work.    -Social work consult

## 2023-06-06 NOTE — PLAN OF CARE
Problem: Adult Inpatient Plan of Care  Goal: Plan of Care Review  Outcome: Ongoing, Progressing  Goal: Patient-Specific Goal (Individualized)  Outcome: Ongoing, Progressing  Goal: Absence of Hospital-Acquired Illness or Injury  Outcome: Ongoing, Progressing  Goal: Optimal Comfort and Wellbeing  Outcome: Ongoing, Progressing     Problem: Skin Injury Risk Increased  Goal: Skin Health and Integrity  Outcome: Ongoing, Progressing     Problem: Pain Acute  Goal: Acceptable Pain Control and Functional Ability  Outcome: Ongoing, Progressing     Problem: Infection  Goal: Absence of Infection Signs and Symptoms  Outcome: Ongoing, Progressing     Problem: Fall Injury Risk  Goal: Absence of Fall and Fall-Related Injury  Outcome: Ongoing, Progressing

## 2023-06-06 NOTE — ASSESSMENT & PLAN NOTE
The patient presents with the signs and symptoms of his known meningitis infection. Culture data previously showed pansensitive strep pneumo    -Repeat blood cultures, NGTD  -Restart IV ceftriaxone for 4 weeks (finish 7/2), will need picc or midline placed prior to D/C for continued abx, attempting LTAC placement.  -Given volume resuscitation  -Daily labs  -Continue to monitor mental status  -ID did not feel they needed to start steroids since he had finished steroids previously    Pt will need a PICC line placed and pending placement at LTAC, patient's living situation should be more stable and home infusion is an option but may not be covered by insurance. Looking LTAC is best option financially if patient is amendable, but patient is concerned about being unable to work.

## 2023-06-07 NOTE — DISCHARGE SUMMARY
Pedro Avalos - Cardiology [facility] Medicine  Discharge Summary      Patient Name: Jesus Jean  MRN: [id number]  MIGUEL ÁNGEL: [id number]  Patient Class: IP- Inpatient  Admission Date: 6/3/2023  Hospital Length of Stay: 3 days  Discharge Date and Time:  06/06/2023 8:40 PM  Attending Physician: Ulises Albright MD   Discharging Provider: Caren Edwards MD  Primary Care Provider: Primary Doctor [facility] Hospital Medicine Team: [facility] MED 3 Caren Edwards MD  Primary Care Team: 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 At that time he got an LP and it showed strep pneumo which was noted to be pansensitive. At that time a TTE was also performed which showed AV vegetation.     The patient left AMA on 5/31 with a course of PO linezolid. He was unable to get the antibiotics due to price    The patient represents with symptoms of neck pain, headache, generalized malaise, body aches, chills, shortness of breath.       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 Pt presented back to the hospital. ID recommended placing on ceftriaxone for a 4 week course. Pt will need a PICC line placed and pending placement at LTAC, patient's living situation should be more stable and home infusion is an option but may not be covered by insurance. The patient was concerned that his car was going to be repossessed and due to additional life stressors decided to leave AMA on 06/06 with PO linezolid. It was stressed to the patient the importance of IV antibiotics. The patient was discharged with a referral  to Infectious Disease for follow up with the patient voicing understanding.        Goals of Care Treatment Preferences:  Code Status: Full Code      Consults:   Consults (From admission, onward)        Status Ordering Provider     Inpatient consult to Social Work  Once        Provider:  (Not yet assigned)    Acknowledged KATE HIGHTOWER     Inpatient consult to Infectious Diseases  Once        Provider:  (Not yet assigned)    Completed KATE HIGHTOWER          No new Assessment & Plan notes have been filed under this hospital service since the last note was generated.  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] 05/27/2023 Yes    Discharge planning issues [Z02.9] 06/04/2023 Not Applicable    Aortic valve vegetation [I33.0] 05/31/2023 Yes    Bacteremia due to Streptococcus pneumoniae [R78.81, B95.3] 05/29/2023 Yes    Microcytic anemia [D50.9] 05/29/2023 Yes    Microscopic hematuria [R31.29] 05/29/2023 Yes    Elevated LFTs [R79.89] 05/29/2023 Yes    Abnormal CT of the head [R93.0] 05/27/2023 Yes      Problems Resolved During this Admission:       Discharged Condition: against medical advice    Disposition:     Follow Up:    Patient Instructions:      Ambulatory referral/consult to Infectious Disease   Standing Status: Future   Referral Priority: Routine Referral Type: Consultation   Referral Reason: Specialty Services Required   Requested Specialty: Infectious Diseases   Number of Visits Requested: 1       Significant Diagnostic Studies: N/A    Pending Diagnostic Studies:     None         Medications:  Reconciled Home Medications:      Medication List      CONTINUE taking these medications    acetaminophen 325 MG tablet  Commonly known as: TYLENOL  Take 2 tablets (650 mg total) by mouth every 4 (four) hours as needed for Pain (headaches).     linezolid 600 mg Tab  Commonly known as: ZYVOX  Take 1 tablet  (600 mg total) by mouth every 12 (twelve) hours.            Indwelling Lines/Drains at time of discharge:   Lines/Drains/Airways     None                 Time spent on the discharge of patient: 60 minutes         Caren Edwards MD  Department of Hospital Medicine  [facility] - Cardiology Stepdown

## 2023-06-07 NOTE — NURSING
Pt left AMA. Telemetry monitor and leads were removed along with peripheral IV. MD at the bedside to provide discharge instructions and paperwork. All questions and concerns addressed. Pt remained adamant about leaving. Pt walked down to garage where he says his fiance is waiting to pick him up.

## 2023-06-07 NOTE — PLAN OF CARE
Pt left AMA after notifying RN Damien Hanson that his apartment was approved by [facility] and his oral medications were affordable.     Nay Galvez, [facility]  [place]  [email or web address]    Pedro Avalos - Cardiology Stepdown  Discharge Final Note    Primary Care Provider: Primary Doctor No    Expected Discharge Date: 6/9/2023    Final Discharge Note (most recent)       Final Note - 06/07/23 1301          Final Note    Assessment Type Final Discharge Note     Anticipated Discharge Disposition Left Against Medical Advice     What phone number can be called within the next 1-3 days to see how you are doing after discharge? [phone]        Post-Acute Status    Post-Acute Authorization Other     Other Status See Comments   Pt left AMA                    Important Message from Medicare           Future Appointments   Date Time Provider Department Center   7/18/2023 11:00 AM Inna Fields MD [facility] ID Pedro Avalos

## 2023-06-09 LAB
BACTERIA BLD CULT: NORMAL
BACTERIA BLD CULT: NORMAL

## 2023-06-28 ENCOUNTER — TELEPHONE (OUTPATIENT)
Dept: INFECTIOUS DISEASES | Facility: CLINIC | Age: 43
End: 2023-06-28
Payer: MEDICAID

## 2023-06-28 NOTE — TELEPHONE ENCOUNTER
The patient left [place] on 5/31 with a course of PO linezolid. He was unable to get the antibiotics due to price.

## 2023-07-02 ENCOUNTER — TELEPHONE (OUTPATIENT)
Dept: EMERGENCY MEDICINE | Facility: HOSPITAL | Age: 43
End: 2023-07-02
Payer: MEDICAID

## 2023-07-02 NOTE — TELEPHONE ENCOUNTER
Pt missed ID appointment and requesting refill on linezolid.  He was seen in ED 05/2023.  Pt reports he is having mild lightheadedness and fatigue.  He denies fever, SOB, CP, syncope.      Advised pt to present to ED.  Pt agreeable.

## 2023-07-02 NOTE — TELEPHONE ENCOUNTER
Patient called again requesting refill of linezolid.  He was admitted for meningitis with bacteremia and left against medical advice.  He did not show up at his infectious disease appointment.  I discussed with him that I think he needs to be re-evaluated rather than just extending his antibiotic treatment.  I explained to him that he should follow up with Infectious Disease but if he is feeling sick he should return to the ED for evaluation.  He voiced understanding.

## 2023-07-02 NOTE — TELEPHONE ENCOUNTER
Returned patient's call, he told unit clerk that he did not receive medication, no recent ED visits in his chart.  I called to speak with him, no answer.  Voicemail left

## 2023-07-05 ENCOUNTER — TELEPHONE (OUTPATIENT)
Dept: INFECTIOUS DISEASES | Facility: CLINIC | Age: 43
End: 2023-07-05
Payer: MEDICAID

## 2023-07-05 NOTE — TELEPHONE ENCOUNTER
----- Message from Mell Camarillo sent at 7/5/2023  9:14 AM CDT -----  Regarding: Refill request  Contact: [phone]  Hi, pt called to request a refill on the linezolid (ZYVOX) 600 mg Tab. Pls call the pt to confirm if pt will receive a refill on the medication at [phone]  Pt seems worried because of his diagnosis.       [id number] - [facility] [address] AT [facility] [address]  [facility]  [facility] [address]  Phone: [phone] Fax: [phone]

## 2023-07-05 NOTE — TELEPHONE ENCOUNTER
----- Message from Brenda Smith MA sent at 7/5/2023  2:41 PM CDT -----  Regarding: medication refill  Contact: patient at  or   Pt called was extremely upset about being  hung up on stated that the call is involving his medication linezolid (ZYVOX) 600 mg Tab 56 tablet  Sig - Route: Take 1 tablet (600 mg total) by mouth every 12 (twelve) hours.     Stated that he would like to speak with  manager or supervisor ASAP     Please call patient at  or      #patient doesn't want to speak with no one other than the manager or supervisor.

## 2023-07-05 NOTE — TELEPHONE ENCOUNTER
He was the pt I messaged about before. Halina is not here and being the lead I am here in her stead. I cannot call him back because he is extremely rude.

## 2023-07-05 NOTE — TELEPHONE ENCOUNTER
Pt was extremely upset and yelling at me on the phone. I explained what the provider had to say involving his medication. He accused me and [facility] of treating him less than due to his insurance. I assured him that was not the case and I just wanted to explained why the provider would like him to be re-evaluated through the ER. Pt left AMA from his last hospital stay. He continued to be rude and would not lt me talk. And I could not carry on the conversation any further. I ended the call.

## 2023-07-11 ENCOUNTER — TELEPHONE (OUTPATIENT)
Dept: INFECTIOUS DISEASES | Facility: CLINIC | Age: 43
End: 2023-07-11
Payer: MEDICAID

## 2023-07-11 NOTE — TELEPHONE ENCOUNTER
Spoke to pt,     Pt requested a refill to [facility]  Informed pt we are unable to refill the medication. Dr Fields recommends IV antibiotics not oral antibiotics.   Explained to pt, he needs to go to the ED to get evaluated and treated  Pt refuse to go to the ED.  Stated he will not be going to the hospital again, also refuses to get a line is his arm.   Pt is very upset, raising his voice, stated ID clinic is not managing his care correctly.   Pt stated he would seek care elsewhere.

## 2023-09-04 PROBLEM — A40.3 SEPSIS DUE TO PNEUMOCOCCUS: Status: RESOLVED | Noted: 2023-05-31 | Resolved: 2023-09-04

## 2025-05-23 ENCOUNTER — HOSPITAL ENCOUNTER (EMERGENCY)
Facility: HOSPITAL | Age: 45
Discharge: HOME | End: 2025-05-23
Attending: EMERGENCY MEDICINE

## 2025-05-23 VITALS
RESPIRATION RATE: 18 BRPM | TEMPERATURE: 99 F | HEIGHT: 66 IN | SYSTOLIC BLOOD PRESSURE: 115 MMHG | OXYGEN SATURATION: 99 % | WEIGHT: 165 LBS | BODY MASS INDEX: 26.52 KG/M2 | DIASTOLIC BLOOD PRESSURE: 68 MMHG | HEART RATE: 65 BPM

## 2025-05-23 DIAGNOSIS — R06.02 SHORTNESS OF BREATH: ICD-10-CM

## 2025-05-23 DIAGNOSIS — F19.920 DRUG INTOXICATION WITHOUT COMPLICATION: Primary | ICD-10-CM

## 2025-05-23 DIAGNOSIS — R07.9 CHEST PAIN: ICD-10-CM

## 2025-05-23 LAB
ABSOLUTE EOSINOPHIL (OHS): 0 K/UL
ABSOLUTE MONOCYTE (OHS): 0.66 K/UL (ref 0.3–1)
ABSOLUTE NEUTROPHIL COUNT (OHS): 11.87 K/UL (ref 1.8–7.7)
ALBUMIN SERPL BCP-MCNC: 3 G/DL (ref 3.5–5.2)
ALP SERPL-CCNC: 65 UNIT/L (ref 40–150)
ALT SERPL W/O P-5'-P-CCNC: 36 UNIT/L (ref 10–44)
ANION GAP (OHS): 7 MMOL/L (ref 8–16)
AST SERPL-CCNC: 41 UNIT/L (ref 11–45)
BASOPHILS # BLD AUTO: 0.05 K/UL
BASOPHILS NFR BLD AUTO: 0.4 %
BILIRUB SERPL-MCNC: 0.2 MG/DL (ref 0.1–1)
BNP SERPL-MCNC: 17 PG/ML (ref 0–99)
BUN SERPL-MCNC: 7 MG/DL (ref 6–20)
CALCIUM SERPL-MCNC: 7.4 MG/DL (ref 8.7–10.5)
CHLORIDE SERPL-SCNC: 110 MMOL/L (ref 95–110)
CO2 SERPL-SCNC: 24 MMOL/L (ref 23–29)
CREAT SERPL-MCNC: 0.8 MG/DL (ref 0.5–1.4)
ERYTHROCYTE [DISTWIDTH] IN BLOOD BY AUTOMATED COUNT: 16.1 % (ref 11.5–14.5)
GFR SERPLBLD CREATININE-BSD FMLA CKD-EPI: >60 ML/MIN/1.73/M2
GLUCOSE SERPL-MCNC: 136 MG/DL (ref 70–110)
HCT VFR BLD AUTO: 36.8 % (ref 40–54)
HCV AB SERPL QL IA: NORMAL
HGB BLD-MCNC: 11.5 GM/DL (ref 14–18)
HIV 1+2 AB+HIV1 P24 AG SERPL QL IA: NORMAL
HOLD SPECIMEN: NORMAL
IMM GRANULOCYTES # BLD AUTO: 0.12 K/UL (ref 0–0.04)
IMM GRANULOCYTES NFR BLD AUTO: 0.9 % (ref 0–0.5)
LYMPHOCYTES # BLD AUTO: 1.32 K/UL (ref 1–4.8)
MCH RBC QN AUTO: 24 PG (ref 27–31)
MCHC RBC AUTO-ENTMCNC: 31.3 G/DL (ref 32–36)
MCV RBC AUTO: 77 FL (ref 82–98)
NUCLEATED RBC (/100WBC) (OHS): 0 /100 WBC
OHS QRS DURATION: 86 MS
OHS QTC CALCULATION: 390 MS
PLATELET # BLD AUTO: 232 K/UL (ref 150–450)
PMV BLD AUTO: 9.9 FL (ref 9.2–12.9)
POTASSIUM SERPL-SCNC: 3.7 MMOL/L (ref 3.5–5.1)
PROT SERPL-MCNC: 5.3 GM/DL (ref 6–8.4)
RBC # BLD AUTO: 4.79 M/UL (ref 4.6–6.2)
RELATIVE EOSINOPHIL (OHS): 0 %
RELATIVE LYMPHOCYTE (OHS): 9.4 % (ref 18–48)
RELATIVE MONOCYTE (OHS): 4.7 % (ref 4–15)
RELATIVE NEUTROPHIL (OHS): 84.6 % (ref 38–73)
SODIUM SERPL-SCNC: 141 MMOL/L (ref 136–145)
TROPONIN I SERPL HS-MCNC: 17 NG/L
TROPONIN I SERPL HS-MCNC: 29 NG/L
TROPONIN I SERPL HS-MCNC: 32 NG/L
WBC # BLD AUTO: 14.02 K/UL (ref 3.9–12.7)

## 2025-05-23 PROCEDURE — 25000003 PHARM REV CODE 250

## 2025-05-23 PROCEDURE — 96361 HYDRATE IV INFUSION ADD-ON: CPT

## 2025-05-23 PROCEDURE — 63600175 PHARM REV CODE 636 W HCPCS

## 2025-05-23 PROCEDURE — 63600175 PHARM REV CODE 636 W HCPCS: Performed by: STUDENT IN AN ORGANIZED HEALTH CARE EDUCATION/TRAINING PROGRAM

## 2025-05-23 PROCEDURE — 25000003 PHARM REV CODE 250: Performed by: STUDENT IN AN ORGANIZED HEALTH CARE EDUCATION/TRAINING PROGRAM

## 2025-05-23 PROCEDURE — 80053 COMPREHEN METABOLIC PANEL: CPT | Performed by: EMERGENCY MEDICINE

## 2025-05-23 PROCEDURE — 96375 TX/PRO/DX INJ NEW DRUG ADDON: CPT

## 2025-05-23 PROCEDURE — 85025 COMPLETE CBC W/AUTO DIFF WBC: CPT | Performed by: EMERGENCY MEDICINE

## 2025-05-23 PROCEDURE — 99285 EMERGENCY DEPT VISIT HI MDM: CPT | Mod: 25

## 2025-05-23 PROCEDURE — 84484 ASSAY OF TROPONIN QUANT: CPT

## 2025-05-23 PROCEDURE — 86803 HEPATITIS C AB TEST: CPT | Performed by: EMERGENCY MEDICINE

## 2025-05-23 PROCEDURE — 96376 TX/PRO/DX INJ SAME DRUG ADON: CPT

## 2025-05-23 PROCEDURE — 93005 ELECTROCARDIOGRAM TRACING: CPT

## 2025-05-23 PROCEDURE — 93010 ELECTROCARDIOGRAM REPORT: CPT | Mod: ,,, | Performed by: INTERNAL MEDICINE

## 2025-05-23 PROCEDURE — 96365 THER/PROPH/DIAG IV INF INIT: CPT

## 2025-05-23 PROCEDURE — 83880 ASSAY OF NATRIURETIC PEPTIDE: CPT | Performed by: EMERGENCY MEDICINE

## 2025-05-23 PROCEDURE — 87389 HIV-1 AG W/HIV-1&-2 AB AG IA: CPT | Performed by: EMERGENCY MEDICINE

## 2025-05-23 PROCEDURE — 84484 ASSAY OF TROPONIN QUANT: CPT | Performed by: EMERGENCY MEDICINE

## 2025-05-23 RX ORDER — ONDANSETRON HYDROCHLORIDE 2 MG/ML
4 INJECTION, SOLUTION INTRAVENOUS
Status: COMPLETED | OUTPATIENT
Start: 2025-05-23 | End: 2025-05-23

## 2025-05-23 RX ADMIN — SODIUM CHLORIDE 1000 ML: 9 INJECTION, SOLUTION INTRAVENOUS at 12:05

## 2025-05-23 RX ADMIN — PROMETHAZINE HYDROCHLORIDE 12.5 MG: 25 INJECTION INTRAMUSCULAR; INTRAVENOUS at 04:05

## 2025-05-23 RX ADMIN — ONDANSETRON 4 MG: 2 INJECTION INTRAMUSCULAR; INTRAVENOUS at 02:05

## 2025-05-23 RX ADMIN — ONDANSETRON 4 MG: 2 INJECTION INTRAMUSCULAR; INTRAVENOUS at 12:05

## 2025-05-23 NOTE — ED NOTES
Telemetry Verification   Patient placed on Telemetry Box  Verified by Lorena   Box # [id number]   Monitor Tech    Rate 64   Rhythm NS

## 2025-05-23 NOTE — ED PROVIDER NOTES
Encounter Date: 2025       History     Chief Complaint   Patient presents with    Shortness of Breath     Pt arrived via EMS with a c/o SOB. Pt wondered to a neighbors house in his underwear and stated he needed an ambulance. He admits to using heroin last pm. VSS.     45 yo male with no reported past medical history presents to the emergency department with shortness of breath. He reports ingesting heroin and a few muscle relaxants last night. He said after using, he states that he felt like he couldn't breathe, so he started running around trying to find someone who could call an ambulance. He currently denies shortness of breath, but is endorsing mild chest pain, nausea, and vomiting. He is tearful and states that he feels very afraid that he could have . He states he uses heroin weekly but has never felt like this before. He always uses the same dealer, however, states that this was a new batch of heroin. He denies injection drug use. He denies co-ingestion of alcohol. He denies fever, chills, vision changes, headache, abdominal pain, bloody stools, or any area of pain.     Patient requires frequent re-direction throughout the exam due to intoxication.     The history is provided by the patient.     Review of patient's allergies indicates:  No Known Allergies  History reviewed. No pertinent past medical history.  History reviewed. No pertinent surgical history.  No family history on file.  Social History[1]  Review of Systems    Physical Exam     Initial Vitals   BP Pulse Resp Temp SpO2   25 1056 25 1056 25 1056 25 1057 25 1056   (!) 145/90 79 16 98 °F (36.7 °C) 95 %      MAP       --                Physical Exam    Nursing note and vitals reviewed.  Constitutional: He appears well-developed. He is not diaphoretic. He appears distressed.   Ill appearing male, vomiting, tearful. Patient is very drowsy, intoxicated, and requires frequent re-direction.    HENT:   Head:  Normocephalic and atraumatic.   Eyes: Conjunctivae are normal.   Pupils appear dilated, unable to assess EOM due to poor cooperation   Neck:   Normal range of motion.  Cardiovascular:  Normal rate and regular rhythm.           No murmur heard.  Pulmonary/Chest: Breath sounds normal. No respiratory distress. He has no wheezes. He exhibits no tenderness.   Abdominal: Abdomen is soft. He exhibits no distension. There is no abdominal tenderness. There is no rebound and no guarding.   Musculoskeletal:      Cervical back: Normal range of motion.      Comments: Faint track augustin noted to right AC.     Neurological: He is alert and oriented to person, place, and time. GCS eye subscore is 3. GCS verbal subscore is 5. GCS motor subscore is 6.   Skin: Skin is warm and dry.   Psychiatric: His mood appears anxious.         ED Course   Procedures  Labs Reviewed   COMPREHENSIVE METABOLIC PANEL - Abnormal       Result Value    Sodium 141      Potassium 3.7      Chloride 110      CO2 24      Glucose 136 (*)     BUN 7      Creatinine 0.8      Calcium 7.4 (*)     Protein Total 5.3 (*)     Albumin 3.0 (*)     Bilirubin Total 0.2      ALP 65      AST 41      ALT 36      Anion Gap 7 (*)     eGFR >60     CBC WITH DIFFERENTIAL - Abnormal    WBC 14.02 (*)     RBC 4.79      HGB 11.5 (*)     HCT 36.8 (*)     MCV 77 (*)     MCH 24.0 (*)     MCHC 31.3 (*)     RDW 16.1 (*)     Platelet Count 232      MPV 9.9      Nucleated RBC 0      Neut % 84.6 (*)     Lymph % 9.4 (*)     Mono % 4.7      Eos % 0.0      Basophil % 0.4      Imm Grans % 0.9 (*)     Neut # 11.87 (*)     Lymph # 1.32      Mono # 0.66      Eos # 0.00      Baso # 0.05      Imm Grans # 0.12 (*)    HEPATITIS C ANTIBODY - Normal    Hep C Ab Interp Non-Reactive     HIV 1 / 2 ANTIBODY - Normal    HIV 1/2 Ag/Ab Non-Reactive     TROPONIN I HIGH SENSITIVITY - Normal    Troponin High Sensitive 17     TROPONIN I HIGH SENSITIVITY - Normal    Troponin High Sensitive 32     B-TYPE NATRIURETIC  PEPTIDE - Normal    BNP 17     TROPONIN I HIGH SENSITIVITY - Normal    Troponin High Sensitive 29     HEP C VIRUS HOLD SPECIMEN    Extra Tube Hold for add-ons.     CBC W/ AUTO DIFFERENTIAL    Narrative:     The following orders were created for panel order CBC auto differential.  Procedure                               Abnormality         Status                     ---------                               -----------         ------                     CBC with Differential[[id number]]       Abnormal            Final result                 Please view results for these tests on the individual orders.        ECG Results              EKG 12-lead (Final result)        Collection Time Result Time QRS Duration OHS QTC Calculation    05/23/25 11:57:34 05/23/25 13:56:29 86 390                     Final result by Interface, Lab In [facility] (05/23/25 13:56:37)                   Narrative:    Test Reason : R07.9,    Vent. Rate :  58 BPM     Atrial Rate :  58 BPM     P-R Int : 138 ms          QRS Dur :  86 ms      QT Int : 398 ms       P-R-T Axes :  50  79  68 degrees    QTcB Int : 390 ms    Sinus bradycardia with Premature supraventricular complexes  Otherwise normal ECG  No previous ECGs available  Confirmed by Joann Khan ([id number]) on 5/23/2025 1:56:28 PM    Referred By:            Confirmed By: Joann Khan                                  Imaging Results              X-Ray Chest AP Portable (Final result)  Result time 05/23/25 12:31:06      Final result by Janak Mosqueda III, MD (05/23/25 12:31:06)                   Impression:      No acute process seen.      Electronically signed by: Janak Mosqueda MD  Date:    05/23/2025  Time:    12:31               Narrative:    EXAMINATION:  XR CHEST AP PORTABLE    CLINICAL HISTORY:  Shortness of breath    FINDINGS:  Chest one view:    Heart size is normal.  Lungs are clear and the bones bowel gas are noncontributory.                                       Medications    ondansetron injection 4 mg (4 mg Intravenous Given 5/23/25 1242)   sodium chloride 0.9% bolus 1,000 mL 1,000 mL (0 mLs Intravenous Stopped 5/23/25 1455)   ondansetron injection 4 mg (4 mg Intravenous Given 5/23/25 1431)   promethazine (PHENERGAN) 12.5 mg in 0.9% NaCl 50 mL IVPB (0 mg Intravenous Stopped 5/23/25 1637)     Medical Decision Making  Patient is a 44 year-old M who presents to the emergency department with complaints of drug use. Patient is non-toxic appearing, afebrile, and hemodynamically stable. History and PE limited by patient intoxication    Differential diagnosis includes but is not limited to illicit drug intoxication, overdose, opioid use disorder, polysubstance abuse, ACS, aspiration pneumonia    Labs and imaging reviewed, third troponin added due to mild uptrend in initial trops (17->32), given it is down trending (29) will d/c home, see ED course for full details. Patient initially had multiple episodes of vomiting, given fluids and Zofran x2. Upon reassessment, he continues to vomit, given phenergan with good relief of his symptoms. He is no longer intoxicated.    Disposition   Patient was discharged home with instruction to follow up with a PCP to discuss today's visit and any other concerns. Motivated patient to discontinue drug use and gave a list of community resources to help with opioid use disorder. Given strict ED return precautions. Patient reassessed, discussed dispo, given opportunity to ask additional questions prior to disposition.    Amount and/or Complexity of Data Reviewed  Labs: ordered. Decision-making details documented in ED Course.  Radiology: ordered. Decision-making details documented in ED Course.    Risk  Prescription drug management.               ED Course as of 05/25/25 1559   Fri May 23, 2025   1208 Labs reviewed, leukocytosis (14), anemia (11.5), no thrombocytopenia, no significant electrolyte derangement or renal dysfunction. [NK]   1210 BNP: 17 [NK]   1210  Troponin I High Sensitivity: 17 [NK]   1304 X-Ray Chest AP Portable  Per my independent interpretation, no signs of consolidation or pulmonary edema. [NK]   1432 Patient is a 44-year-old male past medical history heroin use that is presenting for shortness of breath.  As per patient, patient states that he used heroin last night.  After using patient states that he felt like he could not breathe, then started trying to run to local houses to help him called the ambulance.  Patient then arrived here this morning, states that he has mild chest pain, nausea, vomiting.  Initially on arrival patient was mildly sedated.  Patient denies any fevers, chills, abdominal pain.    Physical exam:  Well-appearing 44-year-old male, no distress.  Initially mildly sedated however symptomatically greatly improved.  Benign cardiac, respiratory, abdominal exam.  The patient is breathing comfortably on room air.  Patient had 1-2 episode of vomiting here however now asymptomatic.    Plan:  Workup reassuring.  At this time we will discharge.  Consider likely secondary to drug use.  Patient feels symptomatically improved.  Advised patient to stop doing heroin.  Patient understands.    Attestation of Dr. Amaya (Attending Physician):      I have reviewed the record and the patient care provided by the Resident provider and agree with the documented HPI, ROS, PE.  I also agree with the treatment plan and work up and I have performed an independent history / physical exam and MDM.   [RT]   1532 Troponin I High Sensitivity: 32  Patient denies any chest pains at this time.  The patient had mild elevation however still within normal range.  We will collect 3rd troponin to ensure that patient has no obvious affects from illicit drug use.  If flat or downtrending then we will discharge.   [RT]   1657 Troponin I High Sensitivity: 29  3rd trop downtrending, will d/c home as patient is feeling better and is comfortable with plan. [NK]      ED Course  User Index  [NK] Ran Alvarado PA-C  [RT] Soham Amaya MD                           Clinical Impression:  Final diagnoses:  [R07.9] Chest pain  [R06.02] Shortness of breath  [F19.920] Drug intoxication without complication (Primary)          ED Disposition Condition    Discharge           ED Prescriptions    None       Follow-up Information       Follow up With Specialties Details Why Contact Info    [facility] - Emergency Dept Emergency Medicine Go to  If symptoms worsen [address] [facility] [phone]                 Ran Alvarado PA-C  05/23/25 1700         [1]   Social History  Tobacco Use    Smoking status: Every Day     Types: Cigarettes        Soham Amaya MD  05/25/25 1791

## 2025-05-23 NOTE — ED NOTES
Patient identifiers for Joe Garrido 44 y.o. male checked and correct.  Chief Complaint   Patient presents with    Shortness of Breath     Pt arrived via EMS with a c/o SOB. Pt wondered to a neighbors house in his underwear and stated he needed an ambulance. He admits to using heroin last pm. VSS.     History reviewed. No pertinent past medical history.  Allergies reported: Review of patient's allergies indicates:  No Known Allergies    Most recent vital signs:  Vitals:    05/23/25 1212   BP: 130/74   Pulse: 62   Resp: 14   Temp: 98.5 °F (36.9 °C)

## 2025-05-23 NOTE — DISCHARGE INSTRUCTIONS
It was a pleasure taking care of you today!     Home Care:   - Eat bland foods (banana, rice, applesauce, toast) for the next 48 hours until you are no longer nauseous or vomiting.  - Refrain from use of illicit drugs, I have included some resources to help with opioid use disorder    Follow-Up Plan:  - Follow-up with primary care doctor within 3-5 days to discuss your recent ER visit and any additional concerns that you may have.  - Additional testing and/or evaluation as directed by your primary doctor    Return to the Emergency Department for symptoms including but not limited to: persistence or worsening of symptoms, shortness of breath or chest pain, inability to drink without vomiting, passing out/fainting/ loss of consciousness, or if you have other concerns.

## 2025-05-23 NOTE — PROVIDER PROGRESS NOTES - EMERGENCY DEPT.
Encounter Date: 5/23/2025    ED Physician Progress Notes          EKG: Rate 58, sinus bradycardia, PVCs, no obvious STEMI